# Patient Record
Sex: MALE | Race: WHITE | NOT HISPANIC OR LATINO | Employment: FULL TIME | URBAN - METROPOLITAN AREA
[De-identification: names, ages, dates, MRNs, and addresses within clinical notes are randomized per-mention and may not be internally consistent; named-entity substitution may affect disease eponyms.]

---

## 2018-04-21 ENCOUNTER — HOSPITAL ENCOUNTER (OUTPATIENT)
Facility: CLINIC | Age: 53
Discharge: HOME | End: 2018-04-21
Attending: EMERGENCY MEDICINE
Payer: COMMERCIAL

## 2018-04-21 VITALS
OXYGEN SATURATION: 97 % | DIASTOLIC BLOOD PRESSURE: 82 MMHG | HEIGHT: 70 IN | WEIGHT: 238 LBS | SYSTOLIC BLOOD PRESSURE: 128 MMHG | RESPIRATION RATE: 16 BRPM | TEMPERATURE: 98.5 F | BODY MASS INDEX: 34.07 KG/M2 | HEART RATE: 55 BPM

## 2018-04-21 DIAGNOSIS — J01.90 ACUTE NON-RECURRENT SINUSITIS, UNSPECIFIED LOCATION: Primary | ICD-10-CM

## 2018-04-21 PROCEDURE — 99213 OFFICE O/P EST LOW 20 MIN: CPT | Performed by: EMERGENCY MEDICINE

## 2018-04-21 PROCEDURE — S9088 SERVICES PROVIDED IN URGENT: HCPCS | Performed by: EMERGENCY MEDICINE

## 2018-04-21 RX ORDER — TAMSULOSIN HYDROCHLORIDE 0.4 MG/1
0.4 CAPSULE ORAL
COMMUNITY
End: 2018-11-07 | Stop reason: SDUPTHER

## 2018-04-21 RX ORDER — ASPIRIN 81 MG/1
81 TABLET ORAL
COMMUNITY
Start: 2012-11-30

## 2018-04-21 RX ORDER — SIMVASTATIN 10 MG/1
10 TABLET, FILM COATED ORAL
COMMUNITY
Start: 2017-10-29 | End: 2018-11-07 | Stop reason: SDUPTHER

## 2018-04-21 RX ORDER — AMOXICILLIN AND CLAVULANATE POTASSIUM 875; 125 MG/1; MG/1
1 TABLET, FILM COATED ORAL 2 TIMES DAILY
Qty: 20 TABLET | Refills: 0 | Status: SHIPPED | OUTPATIENT
Start: 2018-04-21 | End: 2018-11-07 | Stop reason: ALTCHOICE

## 2018-04-21 ASSESSMENT — ENCOUNTER SYMPTOMS
FEVER: 1
RHINORRHEA: 1
COUGH: 1
SINUS CONGESTION: 1

## 2018-04-21 NOTE — ED PROVIDER NOTES
History  Chief Complaint   Patient presents with   • Cough     Pain L orbital area an dL jaw x 5 days, sts it his typical once/dayer sinus infection. Always gets it on the L side with tyhese sxs/         Cough   Cough characteristics:  Productive  Sputum characteristics:  Yellow  Severity:  Moderate  Onset quality:  Gradual  Duration:  5 days  Timing:  Constant  Progression:  Worsening  Chronicity:  New  Smoker: no    Context: upper respiratory infection    Relieved by:  Nothing  Ineffective treatments:  None tried  Associated symptoms: fever, rhinorrhea and sinus congestion        History reviewed. No pertinent past medical history.    History reviewed. No pertinent surgical history.    Family History   Problem Relation Age of Onset   • Diabetes Mother    • Multiple myeloma Father        Social History   Substance Use Topics   • Smoking status: Never Smoker   • Smokeless tobacco: Never Used   • Alcohol use Not on file       Review of Systems   Constitutional: Positive for fever.   HENT: Positive for rhinorrhea.    Respiratory: Positive for cough.        Physical Exam  ED Triage Vitals [04/21/18 1349]   Temp Heart Rate Resp BP SpO2   36.9 °C (98.5 °F) (!) 55 16 128/82 97 %      Temp Source Heart Rate Source Patient Position BP Location FiO2 (%) (Set)   Oral Monitor Sitting Left upper arm --       Physical Exam   Constitutional: He appears well-developed and well-nourished.   HENT:   Right Ear: Tympanic membrane normal.   Left Ear: Tympanic membrane normal.   Nose: Mucosal edema, rhinorrhea and septal deviation present.   Mouth/Throat: Uvula is midline and oropharynx is clear and moist.   Eyes: Conjunctivae are normal.   Cardiovascular: Regular rhythm and normal heart sounds.    Pulmonary/Chest: Effort normal and breath sounds normal.         Procedures  Procedures    UC Course  ED Course          Clinical Impressions as of Apr 21 1413   Acute non-recurrent sinusitis, unspecified location       MDM                Steve Connelly MD  04/21/18 3261

## 2018-04-30 ENCOUNTER — TRANSCRIBE ORDERS (OUTPATIENT)
Dept: SCHEDULING | Age: 53
End: 2018-04-30

## 2018-04-30 DIAGNOSIS — N28.1 ACQUIRED CYST OF KIDNEY: Primary | ICD-10-CM

## 2018-05-01 ENCOUNTER — APPOINTMENT (OUTPATIENT)
Dept: RADIOLOGY | Age: 53
End: 2018-05-01
Attending: UROLOGY
Payer: COMMERCIAL

## 2018-05-01 DIAGNOSIS — N28.1 ACQUIRED CYST OF KIDNEY: ICD-10-CM

## 2018-05-01 PROCEDURE — 76775 US EXAM ABDO BACK WALL LIM: CPT

## 2018-05-03 ENCOUNTER — TRANSCRIBE ORDERS (OUTPATIENT)
Dept: LAB | Age: 53
End: 2018-05-03

## 2018-05-03 ENCOUNTER — APPOINTMENT (OUTPATIENT)
Dept: LAB | Age: 53
End: 2018-05-03
Attending: UROLOGY
Payer: COMMERCIAL

## 2018-05-03 DIAGNOSIS — N41.9 PROSTATITIS, UNSPECIFIED PROSTATITIS TYPE: ICD-10-CM

## 2018-05-03 DIAGNOSIS — R97.20 ELEVATED PSA: ICD-10-CM

## 2018-05-03 DIAGNOSIS — R97.20 ELEVATED PSA: Primary | ICD-10-CM

## 2018-05-03 LAB — PSA SERPL-MCNC: 7.91 NG/ML

## 2018-05-03 PROCEDURE — 84153 ASSAY OF PSA TOTAL: CPT

## 2018-05-03 PROCEDURE — 36415 COLL VENOUS BLD VENIPUNCTURE: CPT

## 2018-09-12 ENCOUNTER — TRANSCRIBE ORDERS (OUTPATIENT)
Dept: LAB | Age: 53
End: 2018-09-12

## 2018-09-12 ENCOUNTER — APPOINTMENT (OUTPATIENT)
Dept: LAB | Age: 53
End: 2018-09-12
Attending: UROLOGY
Payer: COMMERCIAL

## 2018-09-12 DIAGNOSIS — N41.9 INFLAMMATORY DISEASE OF PROSTATE: Primary | ICD-10-CM

## 2018-09-12 DIAGNOSIS — N41.9 INFLAMMATORY DISEASE OF PROSTATE: ICD-10-CM

## 2018-09-12 DIAGNOSIS — R97.20 ELEVATED PROSTATE SPECIFIC ANTIGEN (PSA): ICD-10-CM

## 2018-09-12 DIAGNOSIS — N40.0 ENLARGED PROSTATE WITHOUT LOWER URINARY TRACT SYMPTOMS (LUTS): ICD-10-CM

## 2018-09-12 LAB — PSA SERPL-MCNC: 8.97 NG/ML

## 2018-09-12 PROCEDURE — 36415 COLL VENOUS BLD VENIPUNCTURE: CPT

## 2018-09-12 PROCEDURE — 84153 ASSAY OF PSA TOTAL: CPT

## 2018-10-01 ENCOUNTER — TRANSCRIBE ORDERS (OUTPATIENT)
Dept: SCHEDULING | Age: 53
End: 2018-10-01

## 2018-10-01 DIAGNOSIS — R97.20 ELEVATED PROSTATE SPECIFIC ANTIGEN (PSA): Primary | ICD-10-CM

## 2018-10-02 ENCOUNTER — TRANSCRIBE ORDERS (OUTPATIENT)
Dept: LAB | Age: 53
End: 2018-10-02

## 2018-10-02 ENCOUNTER — APPOINTMENT (OUTPATIENT)
Dept: LAB | Age: 53
End: 2018-10-02
Attending: UROLOGY
Payer: COMMERCIAL

## 2018-10-02 DIAGNOSIS — R97.20 ELEVATED PROSTATE SPECIFIC ANTIGEN (PSA): ICD-10-CM

## 2018-10-02 DIAGNOSIS — N41.9 INFLAMMATORY DISEASE OF PROSTATE: ICD-10-CM

## 2018-10-02 DIAGNOSIS — R97.20 ELEVATED PROSTATE SPECIFIC ANTIGEN (PSA): Primary | ICD-10-CM

## 2018-10-02 LAB
BUN SERPL-MCNC: 12 MG/DL (ref 8–20)
CREAT SERPL-MCNC: 1.2 MG/DL (ref 0.8–1.3)
GFR SERPL CREATININE-BSD FRML MDRD: >60 ML/MIN/1.73M*2

## 2018-10-02 PROCEDURE — 82565 ASSAY OF CREATININE: CPT

## 2018-10-02 PROCEDURE — 36415 COLL VENOUS BLD VENIPUNCTURE: CPT

## 2018-10-02 PROCEDURE — 84520 ASSAY OF UREA NITROGEN: CPT

## 2018-10-12 ENCOUNTER — HOSPITAL ENCOUNTER (OUTPATIENT)
Dept: RADIOLOGY | Age: 53
Discharge: HOME | End: 2018-10-12
Attending: UROLOGY
Payer: COMMERCIAL

## 2018-10-12 DIAGNOSIS — R97.20 ELEVATED PROSTATE SPECIFIC ANTIGEN (PSA): ICD-10-CM

## 2018-10-12 RX ORDER — GADOBUTROL 604.72 MG/ML
10.5 INJECTION INTRAVENOUS ONCE
Status: COMPLETED | OUTPATIENT
Start: 2018-10-12 | End: 2018-10-12

## 2018-10-12 RX ADMIN — GADOBUTROL 10.5 ML: 604.72 INJECTION INTRAVENOUS at 11:12

## 2018-11-07 ENCOUNTER — OFFICE VISIT (OUTPATIENT)
Dept: PRIMARY CARE | Facility: CLINIC | Age: 53
End: 2018-11-07
Payer: COMMERCIAL

## 2018-11-07 ENCOUNTER — HOSPITAL ENCOUNTER (OUTPATIENT)
Dept: RADIOLOGY | Age: 53
Discharge: HOME | End: 2018-11-07
Attending: FAMILY MEDICINE
Payer: COMMERCIAL

## 2018-11-07 VITALS
HEART RATE: 97 BPM | SYSTOLIC BLOOD PRESSURE: 100 MMHG | DIASTOLIC BLOOD PRESSURE: 70 MMHG | WEIGHT: 237 LBS | HEIGHT: 70 IN | RESPIRATION RATE: 16 BRPM | TEMPERATURE: 98.5 F | BODY MASS INDEX: 33.93 KG/M2 | OXYGEN SATURATION: 96 %

## 2018-11-07 DIAGNOSIS — R97.20 ELEVATED PSA: ICD-10-CM

## 2018-11-07 DIAGNOSIS — M79.671 RIGHT FOOT PAIN: ICD-10-CM

## 2018-11-07 DIAGNOSIS — M54.50 LOW BACK PAIN WITHOUT SCIATICA, UNSPECIFIED BACK PAIN LATERALITY, UNSPECIFIED CHRONICITY: Primary | ICD-10-CM

## 2018-11-07 DIAGNOSIS — M54.50 LOW BACK PAIN WITHOUT SCIATICA, UNSPECIFIED BACK PAIN LATERALITY, UNSPECIFIED CHRONICITY: ICD-10-CM

## 2018-11-07 PROCEDURE — 99213 OFFICE O/P EST LOW 20 MIN: CPT | Performed by: FAMILY MEDICINE

## 2018-11-07 PROCEDURE — 73630 X-RAY EXAM OF FOOT: CPT | Mod: RT

## 2018-11-07 PROCEDURE — 72072 X-RAY EXAM THORAC SPINE 3VWS: CPT

## 2018-11-07 PROCEDURE — 72110 X-RAY EXAM L-2 SPINE 4/>VWS: CPT

## 2018-11-07 RX ORDER — TAMSULOSIN HYDROCHLORIDE 0.4 MG/1
0.4 CAPSULE ORAL NIGHTLY
Qty: 90 CAPSULE | Refills: 1 | Status: SHIPPED | OUTPATIENT
Start: 2018-11-07 | End: 2021-09-13

## 2018-11-07 RX ORDER — ZOLPIDEM TARTRATE 5 MG/1
5 TABLET ORAL NIGHTLY PRN
Qty: 30 TABLET | Refills: 0 | Status: SHIPPED | OUTPATIENT
Start: 2018-11-07 | End: 2019-11-20

## 2018-11-07 RX ORDER — SIMVASTATIN 10 MG/1
10 TABLET, FILM COATED ORAL NIGHTLY
Qty: 90 TABLET | Refills: 1 | Status: SHIPPED | OUTPATIENT
Start: 2018-11-07 | End: 2019-05-07 | Stop reason: SDUPTHER

## 2018-11-07 ASSESSMENT — ENCOUNTER SYMPTOMS
ARTHRALGIAS: 1
CHILLS: 0
POLYDIPSIA: 0
HEMATURIA: 0
ABDOMINAL PAIN: 0
HEADACHES: 0
NUMBNESS: 0
WHEEZING: 0
VOMITING: 0
SLEEP DISTURBANCE: 1
NAUSEA: 0
WEAKNESS: 0
COUGH: 0
DIARRHEA: 0
BACK PAIN: 1
SHORTNESS OF BREATH: 0
DYSURIA: 0
DIFFICULTY URINATING: 1
NERVOUS/ANXIOUS: 1
CONSTIPATION: 0
POLYPHAGIA: 0
BLOOD IN STOOL: 0
FEVER: 0
DIZZINESS: 0
JOINT SWELLING: 0

## 2018-11-07 NOTE — PROGRESS NOTES
"Subjective      Patient ID: Cesar Cruz is a 53 y.o. male.  1965      [facility]        The following have been reviewed and updated as appropriate in this visit:  Problems       Review of Systems   Constitutional: Negative for chills and fever.   Eyes: Negative for visual disturbance.   Respiratory: Negative for cough, shortness of breath and wheezing.    Cardiovascular: Negative for chest pain and leg swelling.   Gastrointestinal: Negative for abdominal pain, blood in stool, constipation, diarrhea, nausea and vomiting.   Endocrine: Negative for cold intolerance, heat intolerance, polydipsia, polyphagia and polyuria.   Genitourinary: Positive for difficulty urinating. Negative for discharge, dysuria and hematuria.   Musculoskeletal: Positive for arthralgias and back pain. Negative for gait problem and joint swelling.   Skin: Negative for rash.   Neurological: Negative for dizziness, syncope, weakness, numbness and headaches.   Psychiatric/Behavioral: Positive for sleep disturbance. The patient is nervous/anxious.    All other systems reviewed and are negative.      Objective     Vitals:    11/07/18 1047   BP: 100/70   BP Location: Right upper arm   Patient Position: Sitting   Pulse: 97   Resp: 16   Temp: 36.9 °C (98.5 °F)   TempSrc: Oral   SpO2: 96%   Weight: 108 kg (237 lb)   Height: 1.765 m (5' 9.5\")     Body mass index is 34.5 kg/m².    Physical Exam   Constitutional: He appears well-developed and well-nourished. No distress.   Cardiovascular: Normal rate, regular rhythm and normal heart sounds.    Pulmonary/Chest: Effort normal and breath sounds normal.   Musculoskeletal: He exhibits no edema, tenderness or deformity.   Neurological: He is alert.   Psychiatric: His speech is normal and behavior is normal. Thought content normal. His mood appears anxious. His affect is angry.   Vitals reviewed.      Assessment/Plan   Problem List Items Addressed This Visit     None      Visit Diagnoses     Low back pain " without sciatica, unspecified back pain laterality, unspecified chronicity    -  Primary    Relevant Orders    X-RAY LUMBAR SPINE COMPLETE 4+ VIEWS    Right foot pain        Relevant Orders    X-RAY FOOT RIGHT 3+ VIEWS    Elevated PSA        sees [facility] now  bx planned  very upset and worried

## 2018-12-05 ENCOUNTER — APPOINTMENT (EMERGENCY)
Dept: RADIOLOGY | Facility: HOSPITAL | Age: 53
End: 2018-12-05
Attending: EMERGENCY MEDICINE
Payer: COMMERCIAL

## 2018-12-05 ENCOUNTER — HOSPITAL ENCOUNTER (EMERGENCY)
Facility: HOSPITAL | Age: 53
Discharge: HOME | End: 2018-12-05
Attending: EMERGENCY MEDICINE
Payer: COMMERCIAL

## 2018-12-05 VITALS
SYSTOLIC BLOOD PRESSURE: 130 MMHG | HEART RATE: 80 BPM | OXYGEN SATURATION: 97 % | TEMPERATURE: 98 F | DIASTOLIC BLOOD PRESSURE: 95 MMHG | RESPIRATION RATE: 18 BRPM

## 2018-12-05 DIAGNOSIS — M54.6 ACUTE RIGHT-SIDED THORACIC BACK PAIN: Primary | ICD-10-CM

## 2018-12-05 LAB
ALBUMIN SERPL-MCNC: 4.4 G/DL (ref 3.4–5)
ALP SERPL-CCNC: 79 IU/L (ref 35–126)
ALT SERPL-CCNC: 35 IU/L (ref 16–63)
ANION GAP SERPL CALC-SCNC: 10 MEQ/L (ref 3–15)
AST SERPL-CCNC: 33 IU/L (ref 15–41)
BACTERIA URNS QL MICRO: ABNORMAL /HPF
BASOPHILS # BLD: 0.04 K/UL (ref 0.01–0.1)
BASOPHILS NFR BLD: 0.4 %
BILIRUB SERPL-MCNC: 0.8 MG/DL (ref 0.3–1.2)
BILIRUB UR QL STRIP.AUTO: NEGATIVE MG/DL
BUN SERPL-MCNC: 16 MG/DL (ref 8–20)
CALCIUM SERPL-MCNC: 9.5 MG/DL (ref 8.9–10.3)
CHLORIDE SERPL-SCNC: 103 MEQ/L (ref 98–109)
CLARITY UR REFRACT.AUTO: ABNORMAL
CO2 SERPL-SCNC: 24 MEQ/L (ref 22–32)
COLOR UR AUTO: YELLOW
CREAT SERPL-MCNC: 1.1 MG/DL
DIFFERENTIAL METHOD BLD: NORMAL
EOSINOPHIL # BLD: 0.08 K/UL (ref 0.04–0.54)
EOSINOPHIL NFR BLD: 0.9 %
ERYTHROCYTE [DISTWIDTH] IN BLOOD BY AUTOMATED COUNT: 13.9 % (ref 11.6–14.4)
GFR SERPL CREATININE-BSD FRML MDRD: >60 ML/MIN/1.73M*2
GLUCOSE SERPL-MCNC: 98 MG/DL (ref 70–99)
GLUCOSE UR STRIP.AUTO-MCNC: NEGATIVE MG/DL
HCT VFR BLDCO AUTO: 49.7 %
HGB BLD-MCNC: 16.5 G/DL
HGB UR QL STRIP.AUTO: ABNORMAL
HYALINE CASTS #/AREA URNS LPF: ABNORMAL /LPF
IMM GRANULOCYTES # BLD AUTO: 0.04 K/UL (ref 0–0.08)
IMM GRANULOCYTES NFR BLD AUTO: 0.4 %
KETONES UR STRIP.AUTO-MCNC: NEGATIVE MG/DL
LEUKOCYTE ESTERASE UR QL STRIP.AUTO: 1
LIPASE SERPL-CCNC: 25 U/L (ref 20–51)
LYMPHOCYTES # BLD: 1.98 K/UL (ref 1.2–3.5)
LYMPHOCYTES NFR BLD: 21.8 %
MCH RBC QN AUTO: 29.6 PG (ref 28–33.2)
MCHC RBC AUTO-ENTMCNC: 33.2 G/DL (ref 32.2–36.5)
MCV RBC AUTO: 89.1 FL (ref 83–98)
MONOCYTES # BLD: 0.82 K/UL (ref 0.3–1)
MONOCYTES NFR BLD: 9 %
NEUTROPHILS # BLD: 6.11 K/UL (ref 1.7–7)
NEUTS SEG NFR BLD: 67.5 %
NITRITE UR QL STRIP.AUTO: NEGATIVE
NRBC BLD-RTO: 0 %
PDW BLD AUTO: 9.7 FL (ref 9.4–12.4)
PH UR STRIP.AUTO: 5.5 [PH]
PLATELET # BLD AUTO: 237 K/UL
POTASSIUM SERPL-SCNC: 4.2 MEQ/L (ref 3.6–5.1)
PROT SERPL-MCNC: 8.1 G/DL (ref 6–8.2)
PROT UR QL STRIP.AUTO: 1
RBC # BLD AUTO: 5.58 M/UL (ref 4.5–5.8)
RBC #/AREA URNS HPF: ABNORMAL /HPF
SODIUM SERPL-SCNC: 137 MEQ/L (ref 136–144)
SP GR UR REFRACT.AUTO: 1.02
SQUAMOUS URNS QL MICRO: 1 /HPF
UROBILINOGEN UR STRIP-ACNC: 0.2 EU/DL
WBC # BLD AUTO: 9.07 K/UL
WBC #/AREA URNS HPF: ABNORMAL /HPF

## 2018-12-05 PROCEDURE — 87086 URINE CULTURE/COLONY COUNT: CPT | Performed by: PHYSICIAN ASSISTANT

## 2018-12-05 PROCEDURE — 85025 COMPLETE CBC W/AUTO DIFF WBC: CPT | Performed by: PHYSICIAN ASSISTANT

## 2018-12-05 PROCEDURE — 99284 EMERGENCY DEPT VISIT MOD MDM: CPT | Mod: 25

## 2018-12-05 PROCEDURE — 63600000 HC DRUGS/DETAIL CODE: Performed by: PHYSICIAN ASSISTANT

## 2018-12-05 PROCEDURE — 36415 COLL VENOUS BLD VENIPUNCTURE: CPT | Performed by: PHYSICIAN ASSISTANT

## 2018-12-05 PROCEDURE — 74176 CT ABD & PELVIS W/O CONTRAST: CPT

## 2018-12-05 PROCEDURE — 83690 ASSAY OF LIPASE: CPT | Performed by: PHYSICIAN ASSISTANT

## 2018-12-05 PROCEDURE — 80053 COMPREHEN METABOLIC PANEL: CPT | Performed by: PHYSICIAN ASSISTANT

## 2018-12-05 PROCEDURE — 81003 URINALYSIS AUTO W/O SCOPE: CPT | Performed by: PHYSICIAN ASSISTANT

## 2018-12-05 PROCEDURE — 25800000 HC PHARMACY IV SOLUTIONS: Performed by: PHYSICIAN ASSISTANT

## 2018-12-05 RX ORDER — LIDOCAINE 50 MG/G
1 PATCH TOPICAL DAILY
Qty: 20 PATCH | Refills: 0 | Status: SHIPPED | OUTPATIENT
Start: 2018-12-05

## 2018-12-05 RX ORDER — KETOROLAC TROMETHAMINE 30 MG/ML
30 INJECTION, SOLUTION INTRAMUSCULAR; INTRAVENOUS ONCE
Status: COMPLETED | OUTPATIENT
Start: 2018-12-05 | End: 2018-12-05

## 2018-12-05 RX ORDER — HYDROCODONE BITARTRATE AND ACETAMINOPHEN 5; 325 MG/1; MG/1
1 TABLET ORAL EVERY 6 HOURS PRN
Qty: 12 TABLET | Refills: 0 | Status: SHIPPED | OUTPATIENT
Start: 2018-12-05 | End: 2019-11-20

## 2018-12-05 RX ADMIN — KETOROLAC TROMETHAMINE 30 MG: 30 INJECTION, SOLUTION INTRAMUSCULAR at 15:05

## 2018-12-05 RX ADMIN — SODIUM CHLORIDE 1000 ML: 9 INJECTION, SOLUTION INTRAVENOUS at 15:04

## 2018-12-05 ASSESSMENT — ENCOUNTER SYMPTOMS
FEVER: 0
NAUSEA: 0
WOUND: 0
CHILLS: 0
NECK PAIN: 0
FLANK PAIN: 1
LIGHT-HEADEDNESS: 0
BACK PAIN: 1
CONSTIPATION: 0
DYSURIA: 0
COLOR CHANGE: 0
DIFFICULTY URINATING: 0
CHEST TIGHTNESS: 0
SEIZURES: 0
DIARRHEA: 0
PALPITATIONS: 0
VOMITING: 0
HEADACHES: 0
FREQUENCY: 0
ABDOMINAL PAIN: 0
HEMATURIA: 1
SHORTNESS OF BREATH: 0

## 2018-12-05 NOTE — ED PROVIDER NOTES
HPI     Chief Complaint   Patient presents with   • Back Pain       53-year-old male complaining of right flank pain times several days.  Patient reports pain started suddenly, worse than any back pain he has ever had in the past and improved after time.  Patient reports back pain has been intermittent.  Denies nausea or vomiting associated with pain.  Patient also reports he recently had prostate biopsy on 11/13/2018.  Reports noticing some blood in urine over the last few weeks after biopsy which he is aware can be normal but also reports noticing what he thought was pus in urine today.  Patient denies fevers, chills, chest pain, shortness of breath.  Patient reports he has chronic low back pain and reports pain today is in a different location, higher up in his back.  Reports taking left over tramadol for pain with temporary relief.             Patient History     History reviewed. No pertinent past medical history.    History reviewed. No pertinent surgical history.    Family History   Problem Relation Age of Onset   • Diabetes Mother    • Multiple myeloma Father        Social History   Substance Use Topics   • Smoking status: Never Smoker   • Smokeless tobacco: Never Used   • Alcohol use Not on file       Systems Reviewed from Nursing Triage:          Review of Systems     Review of Systems   Constitutional: Negative for chills and fever.   Eyes: Negative for visual disturbance.   Respiratory: Negative for chest tightness and shortness of breath.    Cardiovascular: Negative for chest pain and palpitations.   Gastrointestinal: Negative for abdominal pain, constipation, diarrhea, nausea and vomiting.   Genitourinary: Positive for flank pain and hematuria. Negative for difficulty urinating, dysuria and frequency.   Musculoskeletal: Positive for back pain. Negative for gait problem and neck pain.   Skin: Negative for color change, rash and wound.   Neurological: Negative for seizures, syncope, light-headedness and  headaches.        Physical Exam     ED Triage Vitals [12/05/18 1306]   Temp Heart Rate Resp BP SpO2   37.1 °C (98.7 °F) 97 16 (!) 156/99 99 %      Temp Source Heart Rate Source Patient Position BP Location FiO2 (%) (Set)   Oral Monitor -- Left upper arm --                     Patient Vitals for the past 24 hrs:   BP Temp Temp src Pulse Resp SpO2   12/05/18 1306 (!) 156/99 37.1 °C (98.7 °F) Oral 97 16 99 %           Physical Exam   Constitutional: He is oriented to person, place, and time. He appears well-developed and well-nourished. No distress.   HENT:   Head: Normocephalic and atraumatic.   Mouth/Throat: Oropharynx is clear and moist.   Eyes: Conjunctivae are normal. Pupils are equal, round, and reactive to light.   Neck: Normal range of motion. Neck supple.   Cardiovascular: Normal rate, regular rhythm and normal heart sounds.  Exam reveals no friction rub.    No murmur heard.  Pulmonary/Chest: Effort normal and breath sounds normal. No respiratory distress. He has no wheezes. He has no rales.   Abdominal: Soft. Bowel sounds are normal. He exhibits no distension and no mass. There is no tenderness. There is CVA tenderness (R). There is no rigidity, no rebound, no guarding, no tenderness at McBurney's point and negative Quezada's sign.   Neurological: He is alert and oriented to person, place, and time. No cranial nerve deficit or sensory deficit.   Skin: Skin is warm and dry. Capillary refill takes less than 2 seconds.   Psychiatric: He has a normal mood and affect.   Nursing note and vitals reviewed.           Procedures    ED Course & MDM     Labs Reviewed   UA REFLEX CULTURE (MACROSCOPIC) - Abnormal        Result Value    Color, Urine Yellow      Clarity, Urine Cloudy (*)     Specific Gravity, Urine 1.023      pH, Urine 5.5      Leukocyte Esterase +1 (*)     Nitrite, Urine Negative      Protein, Urine +1 (*)     Glucose, Urine Negative      Ketones, Urine Negative      Urobilinogen, Urine 0.2      Bilirubin,  Urine Negative      Blood, Urine Trace (*)    UA MICROSCOPIC - Abnormal     RBC, Urine 0 TO 4      WBC, Urine 0 TO 3      Squamous Epithelial +1 (*)     Hyaline Cast 3 TO 9 (*)     Bacteria, Urine Rare (*)    CBC - Normal    WBC 9.07      RBC 5.58      Hemoglobin 16.5      Hematocrit 49.7      MCV 89.1      MCH 29.6      MCHC 33.2      RDW 13.9      Platelets 237      MPV 9.7     URINE CULTURE   URINALYSIS REFLEX CULTURE    Narrative:     The following orders were created for panel order Urinalysis w/ reflex culture.  Procedure                               Abnormality         Status                     ---------                               -----------         ------                     UA Reflex to Culture (Mac...[[id number]]  Abnormal            Final result               UA Microscopic[[id number]]                Abnormal            Final result                 Please view results for these tests on the individual orders.   CBC AND DIFFERENTIAL    Narrative:     The following orders were created for panel order CBC and differential.  Procedure                               Abnormality         Status                     ---------                               -----------         ------                     CBC[[id number]]                           Normal              Final result               Diff Count[[id number]]                                        Final result                 Please view results for these tests on the individual orders.   DIFF COUNT    Differential Type Auto      nRBC 0.0      Immature Granulocytes 0.4      Neutrophils 67.5      Lymphocytes 21.8      Monocytes 9.0      Eosinophils 0.9      Basophils 0.4      Immature Granulocytes, Absolute 0.04      Neutrophils, Absolute 6.11      Lymphocytes, Absolute 1.98      Monocytes, Absolute 0.82      Eosinophils, Absolute 0.08      Basophils, Absolute 0.04     COMPREHENSIVE METABOLIC PANEL   LIPASE       CT ABDOMEN PELVIS WITHOUT IV CONTRAST    (Results  Pending)               MDM  Number of Diagnoses or Management Options     Amount and/or Complexity of Data Reviewed  Clinical lab tests: reviewed             ED Course as of Dec 05 1552   Wed Dec 05, 2018   1541 Discussed results, proper med use, need for f/u and return precautions, pt verb understanding.    [ET]      ED Course User Index  [ET] Kym Thomason PA C         Clinical Impressions as of Dec 05 1552   Acute right-sided thoracic back pain        Kym Thomason PA C  12/05/18 1557

## 2018-12-05 NOTE — ED ATTESTATION NOTE
The patient was evaluated and managed by the physician assistant / nurse practitioner.     Jerson Santoro MD  12/05/18 9180

## 2018-12-05 NOTE — DISCHARGE INSTRUCTIONS
RETURN TO THE ER IF WORSE  Follow up with primary care doctor as soon as possible  Take medications as prescribed   Do not take medications while working or driving as they can cause drowsiness

## 2018-12-06 ENCOUNTER — TELEPHONE (OUTPATIENT)
Dept: PRIMARY CARE | Facility: CLINIC | Age: 53
End: 2018-12-06

## 2018-12-06 DIAGNOSIS — M54.2 CERVICALGIA: Primary | ICD-10-CM

## 2018-12-06 DIAGNOSIS — G89.29 CHRONIC LOW BACK PAIN, UNSPECIFIED BACK PAIN LATERALITY, WITH SCIATICA PRESENCE UNSPECIFIED: ICD-10-CM

## 2018-12-06 DIAGNOSIS — M54.5 CHRONIC LOW BACK PAIN, UNSPECIFIED BACK PAIN LATERALITY, WITH SCIATICA PRESENCE UNSPECIFIED: ICD-10-CM

## 2018-12-06 PROBLEM — R73.09 ELEVATED GLUCOSE: Status: ACTIVE | Noted: 2017-02-23

## 2018-12-06 PROBLEM — K76.0 FATTY LIVER: Status: ACTIVE | Noted: 2017-02-21

## 2018-12-06 PROBLEM — R97.20 ELEVATED PROSTATE SPECIFIC ANTIGEN (PSA): Status: ACTIVE | Noted: 2018-11-02

## 2018-12-06 PROBLEM — R97.20 ELEVATED PSA: Status: ACTIVE | Noted: 2018-12-06

## 2018-12-06 PROBLEM — N40.1 BPH WITH OBSTRUCTION/LOWER URINARY TRACT SYMPTOMS: Status: ACTIVE | Noted: 2018-12-06

## 2018-12-06 PROBLEM — N13.8 BPH WITH OBSTRUCTION/LOWER URINARY TRACT SYMPTOMS: Status: ACTIVE | Noted: 2018-12-06

## 2018-12-06 LAB — BACTERIA UR CULT: NORMAL

## 2018-12-06 NOTE — TELEPHONE ENCOUNTER
Patient is request script for MRI of entire back. He tried to make appt with neurosurgeon but he is requiring this MRI first. He said the places does 3 sessions for the top, middle and lower back so the script needs to be for entire spine. Please fax to [facility] in [place] at [phone] and call pt on cell once faxed so he can schedule.

## 2019-05-07 ENCOUNTER — TELEPHONE (OUTPATIENT)
Dept: PRIMARY CARE | Facility: CLINIC | Age: 54
End: 2019-05-07

## 2019-05-07 RX ORDER — SIMVASTATIN 10 MG/1
TABLET, FILM COATED ORAL
Qty: 90 TABLET | Refills: 1 | Status: SHIPPED | OUTPATIENT
Start: 2019-05-07 | End: 2019-11-10 | Stop reason: SDUPTHER

## 2019-05-15 DIAGNOSIS — R97.20 ELEVATED PSA: Primary | ICD-10-CM

## 2019-05-15 DIAGNOSIS — Z11.59 NEED FOR HEPATITIS C SCREENING TEST: ICD-10-CM

## 2019-05-15 DIAGNOSIS — R73.09 ELEVATED GLUCOSE: ICD-10-CM

## 2019-05-15 DIAGNOSIS — E78.5 HYPERLIPIDEMIA, UNSPECIFIED HYPERLIPIDEMIA TYPE: Primary | ICD-10-CM

## 2019-05-16 ENCOUNTER — APPOINTMENT (OUTPATIENT)
Dept: LAB | Age: 54
End: 2019-05-16
Attending: FAMILY MEDICINE
Payer: COMMERCIAL

## 2019-05-16 DIAGNOSIS — R73.09 ELEVATED GLUCOSE: ICD-10-CM

## 2019-05-16 DIAGNOSIS — E78.5 HYPERLIPIDEMIA, UNSPECIFIED HYPERLIPIDEMIA TYPE: ICD-10-CM

## 2019-05-16 DIAGNOSIS — Z11.59 NEED FOR HEPATITIS C SCREENING TEST: ICD-10-CM

## 2019-05-16 DIAGNOSIS — R97.20 ELEVATED PSA: ICD-10-CM

## 2019-05-16 LAB
ALBUMIN SERPL-MCNC: 3.8 G/DL (ref 3.4–5)
ALP SERPL-CCNC: 73 IU/L (ref 35–126)
ALT SERPL-CCNC: 28 IU/L (ref 16–63)
ANION GAP SERPL CALC-SCNC: 8 MEQ/L (ref 3–15)
AST SERPL-CCNC: 26 IU/L (ref 15–41)
BILIRUB SERPL-MCNC: 0.6 MG/DL (ref 0.3–1.2)
BUN SERPL-MCNC: 14 MG/DL (ref 8–20)
CALCIUM SERPL-MCNC: 9.2 MG/DL (ref 8.9–10.3)
CHLORIDE SERPL-SCNC: 105 MEQ/L (ref 98–109)
CHOLEST SERPL-MCNC: 163 MG/DL
CO2 SERPL-SCNC: 26 MEQ/L (ref 22–32)
CREAT SERPL-MCNC: 1.2 MG/DL
ERYTHROCYTE [DISTWIDTH] IN BLOOD BY AUTOMATED COUNT: 14.2 % (ref 11.6–14.4)
EST. AVERAGE GLUCOSE BLD GHB EST-MCNC: 123 MG/DL
GFR SERPL CREATININE-BSD FRML MDRD: >60 ML/MIN/1.73M*2
GLUCOSE SERPL-MCNC: 98 MG/DL (ref 70–99)
HBA1C MFR BLD HPLC: 5.9 %
HCT VFR BLDCO AUTO: 49.9 %
HCV AB SER QL: NONREACTIVE
HDLC SERPL-MCNC: 46 MG/DL
HDLC SERPL: 3.5 {RATIO}
HGB BLD-MCNC: 16.4 G/DL
LDLC SERPL CALC-MCNC: 95 MG/DL
MCH RBC QN AUTO: 29.2 PG (ref 28–33.2)
MCHC RBC AUTO-ENTMCNC: 32.9 G/DL (ref 32.2–36.5)
MCV RBC AUTO: 88.9 FL (ref 83–98)
NONHDLC SERPL-MCNC: 117 MG/DL
PDW BLD AUTO: 10.4 FL (ref 9.4–12.4)
PLATELET # BLD AUTO: 206 K/UL
POTASSIUM SERPL-SCNC: 4.6 MEQ/L (ref 3.6–5.1)
PROT SERPL-MCNC: 7.1 G/DL (ref 6–8.2)
PSA SERPL-MCNC: 7.45 NG/ML
RBC # BLD AUTO: 5.61 M/UL (ref 4.5–5.8)
SODIUM SERPL-SCNC: 139 MEQ/L (ref 136–144)
TRIGL SERPL-MCNC: 110 MG/DL (ref 30–149)
WBC # BLD AUTO: 7.41 K/UL

## 2019-05-16 PROCEDURE — 85027 COMPLETE CBC AUTOMATED: CPT

## 2019-05-16 PROCEDURE — 80053 COMPREHEN METABOLIC PANEL: CPT

## 2019-05-16 PROCEDURE — 86803 HEPATITIS C AB TEST: CPT

## 2019-05-16 PROCEDURE — 80061 LIPID PANEL: CPT

## 2019-05-16 PROCEDURE — 84153 ASSAY OF PSA TOTAL: CPT

## 2019-05-16 PROCEDURE — 36415 COLL VENOUS BLD VENIPUNCTURE: CPT

## 2019-05-16 PROCEDURE — 83036 HEMOGLOBIN GLYCOSYLATED A1C: CPT

## 2019-11-20 ENCOUNTER — OFFICE VISIT (OUTPATIENT)
Dept: PRIMARY CARE | Facility: CLINIC | Age: 54
End: 2019-11-20
Payer: COMMERCIAL

## 2019-11-20 VITALS
DIASTOLIC BLOOD PRESSURE: 82 MMHG | BODY MASS INDEX: 38.03 KG/M2 | SYSTOLIC BLOOD PRESSURE: 140 MMHG | RESPIRATION RATE: 16 BRPM | HEART RATE: 102 BPM | OXYGEN SATURATION: 96 % | TEMPERATURE: 98.3 F | HEIGHT: 69 IN | WEIGHT: 256.8 LBS

## 2019-11-20 DIAGNOSIS — R53.83 FATIGUE, UNSPECIFIED TYPE: ICD-10-CM

## 2019-11-20 DIAGNOSIS — E78.5 HYPERLIPIDEMIA, UNSPECIFIED HYPERLIPIDEMIA TYPE: Primary | ICD-10-CM

## 2019-11-20 DIAGNOSIS — R73.09 ELEVATED GLUCOSE: ICD-10-CM

## 2019-11-20 DIAGNOSIS — R97.20 ELEVATED PSA: ICD-10-CM

## 2019-11-20 PROCEDURE — 99213 OFFICE O/P EST LOW 20 MIN: CPT | Performed by: FAMILY MEDICINE

## 2019-11-20 RX ORDER — SIMVASTATIN 10 MG/1
10 TABLET, FILM COATED ORAL NIGHTLY
Qty: 90 TABLET | Refills: 1 | Status: SHIPPED | OUTPATIENT
Start: 2019-11-20 | End: 2020-05-18

## 2019-11-20 ASSESSMENT — ENCOUNTER SYMPTOMS
VOMITING: 0
HEADACHES: 0
CHILLS: 0
FATIGUE: 1
SHORTNESS OF BREATH: 0
DYSURIA: 0
WEAKNESS: 0
HEMATURIA: 0
ABDOMINAL PAIN: 0
BLOOD IN STOOL: 0
DIZZINESS: 0
CONSTIPATION: 0
NAUSEA: 0
POLYPHAGIA: 0
WHEEZING: 0
NUMBNESS: 0
POLYDIPSIA: 0
COUGH: 0
DIARRHEA: 0
FEVER: 0

## 2019-11-20 NOTE — PROGRESS NOTES
"Subjective      Patient ID: Cesar Cruz is a 54 y.o. male.  1965      Med Refill   Associated symptoms include fatigue. Pertinent negatives include no abdominal pain, chest pain, chills, coughing, fever, headaches, nausea, numbness, rash, vomiting or weakness.       The following have been reviewed and updated as appropriate in this visit:  Problems       Review of Systems   Constitutional: Positive for fatigue. Negative for chills and fever.   HENT: Negative for dental problem and hearing loss.    Eyes: Negative for visual disturbance.   Respiratory: Negative for cough, shortness of breath and wheezing.    Cardiovascular: Negative for chest pain and leg swelling.   Gastrointestinal: Negative for abdominal pain, blood in stool, constipation, diarrhea, nausea and vomiting.   Endocrine: Negative for cold intolerance, heat intolerance, polydipsia, polyphagia and polyuria.   Genitourinary: Negative for discharge, dysuria and hematuria.   Musculoskeletal: Negative for gait problem.   Skin: Negative for rash.   Neurological: Negative for dizziness, weakness, numbness and headaches.   All other systems reviewed and are negative.      Objective     Vitals:    11/20/19 1534 11/20/19 1604   BP: (!) 142/78 140/82   Pulse: (!) 102    Resp: 16    Temp: 36.8 °C (98.3 °F)    TempSrc: Oral    SpO2: 96%    Weight: 116 kg (256 lb 12.8 oz)    Height: 1.753 m (5' 9\")      Body mass index is 37.92 kg/m².    Physical Exam   Constitutional: He is oriented to person, place, and time. He appears well-developed and well-nourished. No distress.   Eyes: No scleral icterus.   Cardiovascular: Normal rate, regular rhythm and normal heart sounds.   Pulmonary/Chest: Effort normal and breath sounds normal.   Musculoskeletal: He exhibits no edema.   Neurological: He is alert and oriented to person, place, and time.   gnf   Psychiatric: He has a normal mood and affect. His behavior is normal.   Vitals reviewed.      Assessment/Plan "   Diagnoses and all orders for this visit:    Hyperlipidemia, unspecified hyperlipidemia type (Primary)  -     CBC and differential; Future  -     Comprehensive metabolic panel; Future  -     Lipid panel; Future    Fatigue, unspecified type  -     CBC and differential; Future  -     TSH; Future    Elevated PSA  -     PSA; Future    Elevated glucose  -     Hemoglobin A1c; Future    Other orders  -     simvastatin (ZOCOR) 10 mg tablet; Take 1 tablet (10 mg total) by mouth nightly.

## 2019-12-11 ENCOUNTER — APPOINTMENT (OUTPATIENT)
Dept: LAB | Age: 54
End: 2019-12-11
Attending: FAMILY MEDICINE
Payer: COMMERCIAL

## 2019-12-11 DIAGNOSIS — E78.5 HYPERLIPIDEMIA, UNSPECIFIED HYPERLIPIDEMIA TYPE: ICD-10-CM

## 2019-12-11 DIAGNOSIS — R53.83 FATIGUE, UNSPECIFIED TYPE: ICD-10-CM

## 2019-12-11 DIAGNOSIS — R97.20 ELEVATED PSA: ICD-10-CM

## 2019-12-11 DIAGNOSIS — R73.09 ELEVATED GLUCOSE: ICD-10-CM

## 2019-12-11 LAB
ALBUMIN SERPL-MCNC: 3.8 G/DL (ref 3.4–5)
ALP SERPL-CCNC: 78 IU/L (ref 35–126)
ALT SERPL-CCNC: 34 IU/L (ref 16–63)
ANION GAP SERPL CALC-SCNC: 6 MEQ/L (ref 3–15)
AST SERPL-CCNC: 30 IU/L (ref 15–41)
BASOPHILS # BLD: 0.05 K/UL (ref 0.01–0.1)
BASOPHILS NFR BLD: 0.6 %
BILIRUB SERPL-MCNC: 0.8 MG/DL (ref 0.3–1.2)
BUN SERPL-MCNC: 12 MG/DL (ref 8–20)
CALCIUM SERPL-MCNC: 9.4 MG/DL (ref 8.9–10.3)
CHLORIDE SERPL-SCNC: 103 MEQ/L (ref 98–109)
CHOLEST SERPL-MCNC: 181 MG/DL
CO2 SERPL-SCNC: 28 MEQ/L (ref 22–32)
CREAT SERPL-MCNC: 1.1 MG/DL
DIFFERENTIAL METHOD BLD: NORMAL
EOSINOPHIL # BLD: 0.18 K/UL (ref 0.04–0.54)
EOSINOPHIL NFR BLD: 2.1 %
ERYTHROCYTE [DISTWIDTH] IN BLOOD BY AUTOMATED COUNT: 13.9 % (ref 11.6–14.4)
EST. AVERAGE GLUCOSE BLD GHB EST-MCNC: 114 MG/DL
GFR SERPL CREATININE-BSD FRML MDRD: >60 ML/MIN/1.73M*2
GLUCOSE SERPL-MCNC: 96 MG/DL (ref 70–99)
HBA1C MFR BLD HPLC: 5.6 %
HCT VFR BLDCO AUTO: 54.1 % (ref 40.1–51)
HDLC SERPL-MCNC: 44 MG/DL
HDLC SERPL: 4.1 {RATIO}
HGB BLD-MCNC: 16.9 G/DL
IMM GRANULOCYTES # BLD AUTO: 0.03 K/UL (ref 0–0.08)
IMM GRANULOCYTES NFR BLD AUTO: 0.4 %
LDLC SERPL CALC-MCNC: 107 MG/DL
LYMPHOCYTES # BLD: 2.64 K/UL (ref 1.2–3.5)
LYMPHOCYTES NFR BLD: 30.8 %
MCH RBC QN AUTO: 28.7 PG (ref 28–33.2)
MCHC RBC AUTO-ENTMCNC: 31.2 G/DL (ref 32.2–36.5)
MCV RBC AUTO: 92 FL (ref 83–98)
MONOCYTES # BLD: 0.9 K/UL (ref 0.3–1)
MONOCYTES NFR BLD: 10.5 %
NEUTROPHILS # BLD: 4.76 K/UL (ref 1.7–7)
NEUTS SEG NFR BLD: 55.6 %
NONHDLC SERPL-MCNC: 137 MG/DL
NRBC BLD-RTO: 0 %
PDW BLD AUTO: 10.3 FL (ref 9.4–12.4)
PLATELET # BLD AUTO: 264 K/UL
POTASSIUM SERPL-SCNC: 4.7 MEQ/L (ref 3.6–5.1)
PROT SERPL-MCNC: 7.1 G/DL (ref 6–8.2)
PSA SERPL-MCNC: 6.1 NG/ML
RBC # BLD AUTO: 5.88 M/UL (ref 4.5–5.8)
SODIUM SERPL-SCNC: 137 MEQ/L (ref 136–144)
TRIGL SERPL-MCNC: 148 MG/DL (ref 30–149)
TSH SERPL DL<=0.05 MIU/L-ACNC: 3.29 MIU/L (ref 0.34–5.6)
WBC # BLD AUTO: 8.56 K/UL

## 2019-12-11 PROCEDURE — 80061 LIPID PANEL: CPT

## 2019-12-11 PROCEDURE — 83036 HEMOGLOBIN GLYCOSYLATED A1C: CPT

## 2019-12-11 PROCEDURE — 84443 ASSAY THYROID STIM HORMONE: CPT

## 2019-12-11 PROCEDURE — 36415 COLL VENOUS BLD VENIPUNCTURE: CPT

## 2019-12-11 PROCEDURE — 84153 ASSAY OF PSA TOTAL: CPT

## 2019-12-11 PROCEDURE — 85025 COMPLETE CBC W/AUTO DIFF WBC: CPT

## 2019-12-11 PROCEDURE — 80053 COMPREHEN METABOLIC PANEL: CPT

## 2020-03-09 ENCOUNTER — OFFICE VISIT (OUTPATIENT)
Dept: PRIMARY CARE | Facility: CLINIC | Age: 55
End: 2020-03-09
Payer: COMMERCIAL

## 2020-03-09 VITALS
SYSTOLIC BLOOD PRESSURE: 134 MMHG | HEIGHT: 69 IN | DIASTOLIC BLOOD PRESSURE: 80 MMHG | RESPIRATION RATE: 16 BRPM | HEART RATE: 131 BPM | TEMPERATURE: 99.4 F | OXYGEN SATURATION: 96 % | WEIGHT: 247 LBS | BODY MASS INDEX: 36.58 KG/M2

## 2020-03-09 DIAGNOSIS — R97.20 ELEVATED PSA: ICD-10-CM

## 2020-03-09 DIAGNOSIS — R39.9 UTI SYMPTOMS: Primary | ICD-10-CM

## 2020-03-09 LAB
BILIRUBIN, POC: NEGATIVE
BLOOD URINE, POC: NORMAL
CLARITY, POC: CLEAR
COLOR, POC: YELLOW
GLUCOSE URINE, POC: NEGATIVE
KETONES, POC: NEGATIVE
LEUKOCYTE EST, POC: NEGATIVE
NITRITE, POC: NEGATIVE
PH, POC: 5.5
PROTEIN, POC: NORMAL
SPECIFIC GRAVITY, POC: 1.03
UROBILINOGEN, POC: 0.2

## 2020-03-09 PROCEDURE — 99214 OFFICE O/P EST MOD 30 MIN: CPT | Performed by: FAMILY MEDICINE

## 2020-03-09 PROCEDURE — 81002 URINALYSIS NONAUTO W/O SCOPE: CPT | Performed by: FAMILY MEDICINE

## 2020-03-09 RX ORDER — CIPROFLOXACIN 500 MG/1
500 TABLET ORAL 2 TIMES DAILY
Qty: 20 TABLET | Refills: 0 | Status: SHIPPED | OUTPATIENT
Start: 2020-03-09 | End: 2020-03-13

## 2020-03-09 RX ORDER — FINASTERIDE 5 MG/1
5 TABLET, FILM COATED ORAL DAILY
COMMUNITY
Start: 2020-01-20 | End: 2021-09-13

## 2020-03-09 ASSESSMENT — ENCOUNTER SYMPTOMS
FREQUENCY: 1
CONSTIPATION: 0
DIFFICULTY URINATING: 0
VOMITING: 0
CHILLS: 0
BLOOD IN STOOL: 0
DYSURIA: 1
POLYDIPSIA: 0
DIZZINESS: 0
FLANK PAIN: 1
SORE THROAT: 0
SWEATS: 0
DIARRHEA: 0
HEADACHES: 1
WEAKNESS: 0
SHORTNESS OF BREATH: 0
FEVER: 1
DIAPHORESIS: 0
WHEEZING: 0
NUMBNESS: 0
ABDOMINAL PAIN: 0
NAUSEA: 0
HEMATURIA: 0
COUGH: 0
POLYPHAGIA: 0

## 2020-03-09 NOTE — PROGRESS NOTES
"Subjective      Patient ID: Cesar Cruz is a 55 y.o. male.  1965      UTI    This is a new problem. The current episode started 1 to 4 weeks ago. The problem has been unchanged. The pain is mild. Associated symptoms include flank pain, frequency and hesitancy. Pertinent negatives include no chills, discharge, hematuria, nausea, sweats, urgency or vomiting. He has tried nothing for the symptoms.       The following have been reviewed and updated as appropriate in this visit:  Problems       Review of Systems   Constitutional: Positive for fever. Negative for chills and diaphoresis.   HENT: Negative for congestion, dental problem, hearing loss and sore throat.    Eyes: Negative for visual disturbance.   Respiratory: Negative for cough, shortness of breath and wheezing.    Cardiovascular: Negative for chest pain and leg swelling.   Gastrointestinal: Negative for abdominal pain, blood in stool, constipation, diarrhea, nausea and vomiting.   Endocrine: Negative for cold intolerance, heat intolerance, polydipsia, polyphagia and polyuria.   Genitourinary: Positive for dysuria, flank pain, frequency, hesitancy and penile pain. Negative for difficulty urinating, discharge, genital sores, hematuria, penile swelling, scrotal swelling, testicular pain and urgency.   Musculoskeletal: Negative for gait problem.   Skin: Negative for rash.   Neurological: Positive for headaches. Negative for dizziness, syncope, weakness and numbness.   All other systems reviewed and are negative.      Objective     Vitals:    03/09/20 1608   BP: 134/80   BP Location: Left upper arm   Patient Position: Sitting   Pulse: (!) 131   Resp: 16   Temp: 37.4 °C (99.4 °F)   TempSrc: Oral   SpO2: 96%   Weight: 112 kg (247 lb)   Height: 1.753 m (5' 9\")     Body mass index is 36.48 kg/m².    Physical Exam   Constitutional: He is oriented to person, place, and time. He appears well-developed and well-nourished. No distress.   HENT:   Head: " Normocephalic and atraumatic.   Right Ear: External ear normal.   Left Ear: External ear normal.   Nose: Nose normal.   Eyes: Pupils are equal, round, and reactive to light. Conjunctivae and EOM are normal. No scleral icterus.   Neck: Normal range of motion. Neck supple. No JVD present. No tracheal deviation present. No thyromegaly present.   Cardiovascular: Normal rate, regular rhythm and normal heart sounds. Exam reveals no gallop and no friction rub.   No murmur heard.  Pulmonary/Chest: Effort normal and breath sounds normal. No respiratory distress.   Abdominal: Soft. Bowel sounds are normal. He exhibits no distension and no mass. There is no hepatosplenomegaly. There is no tenderness. There is no rebound, no guarding and no CVA tenderness. No hernia. Hernia confirmed negative in the right inguinal area and confirmed negative in the left inguinal area.   Genitourinary: Testes normal and penis normal.   Musculoskeletal: Normal range of motion. He exhibits no edema or deformity.   Lymphadenopathy:     He has no cervical adenopathy. No inguinal adenopathy noted on the right or left side.   Neurological: He is alert and oriented to person, place, and time. He has normal strength.   gnf   Skin: Skin is warm and dry. No rash noted. He is not diaphoretic.   Psychiatric: He has a normal mood and affect. His behavior is normal.   Vitals reviewed.      Assessment/Plan   Diagnoses and all orders for this visit:    UTI symptoms (Primary)  Comments:  suspect prostatitis  u/a  cx  cipro    Elevated PSA  -     PSA; Future    Other orders  -     ciprofloxacin (CIPRO) 500 mg tablet; Take 1 tablet (500 mg total) by mouth 2 (two) times a day for 10 days.

## 2020-03-10 LAB — SPECIMEN STATUS: NORMAL

## 2020-03-13 ENCOUNTER — TELEPHONE (OUTPATIENT)
Dept: PRIMARY CARE | Facility: CLINIC | Age: 55
End: 2020-03-13

## 2020-03-13 RX ORDER — SULFAMETHOXAZOLE AND TRIMETHOPRIM 800; 160 MG/1; MG/1
1 TABLET ORAL 2 TIMES DAILY
Qty: 20 TABLET | Refills: 0 | Status: SHIPPED | OUTPATIENT
Start: 2020-03-13 | End: 2020-06-11

## 2020-03-13 RX ORDER — SULFAMETHOXAZOLE AND TRIMETHOPRIM 800; 160 MG/1; MG/1
1 TABLET ORAL 2 TIMES DAILY
Qty: 6 TABLET | Refills: 0 | Status: SHIPPED | OUTPATIENT
Start: 2020-03-13 | End: 2020-03-13

## 2020-03-13 NOTE — TELEPHONE ENCOUNTER
Shoulder popping out  At night  Want to change antibiotic.  If not  100 percent  Better day  8  Or 9 of   Bactrim  Call dr NOVA winter

## 2020-05-18 RX ORDER — SIMVASTATIN 10 MG/1
TABLET, FILM COATED ORAL
Qty: 90 TABLET | Refills: 1 | Status: SHIPPED | OUTPATIENT
Start: 2020-05-18 | End: 2020-10-19

## 2020-06-11 ENCOUNTER — TELEMEDICINE (OUTPATIENT)
Dept: PRIMARY CARE | Facility: CLINIC | Age: 55
End: 2020-06-11
Payer: COMMERCIAL

## 2020-06-11 DIAGNOSIS — Z20.822 EXPOSURE TO COVID-19 VIRUS: Primary | ICD-10-CM

## 2020-06-11 PROCEDURE — 99442 PR PHYS/QHP TELEPHONE EVALUATION 11-20 MIN: CPT | Mod: CS | Performed by: FAMILY MEDICINE

## 2020-06-11 ASSESSMENT — ENCOUNTER SYMPTOMS
FEVER: 0
DIZZINESS: 0
EYE DISCHARGE: 0
CHILLS: 0
COUGH: 0
DIARRHEA: 0
VOMITING: 0
RHINORRHEA: 0
SORE THROAT: 0
STRIDOR: 0
ABDOMINAL PAIN: 0
SHORTNESS OF BREATH: 0
WHEEZING: 0
HEADACHES: 0

## 2020-06-11 NOTE — PROGRESS NOTES
Verification of Patient Location:  The patient affirms they are currently located in the following state: [place]    Request for Consent:   Audio Only Encounter   You and I are about to have a telemedicine check-in or visit. This is allowed because you have requested it. This telemedicine visit will be billed to your health insurance or you, if you are self-insured. You understand you will be responsible for any copayments or coinsurances that apply to your telemedicine visit. Before starting our telemedicine visit, I am required to get your consent for this virtual check-in or visit by telemedicine. Do you consent?    Patient Response to Request for Consent:  Yes      Visit Documentation:  Subjective     Patient ID: Cesar Cruz is a 55 y.o. male.  1965      Pt requests covid test ab  Reports flu-like illness in       The following have been reviewed and updated as appropriate in this visit:  Allergies  Meds  Problems       Review of Systems   Constitutional: Negative for chills and fever.   HENT: Negative for congestion, ear discharge, ear pain, postnasal drip, rhinorrhea and sore throat.    Eyes: Negative for discharge.   Respiratory: Negative for cough, shortness of breath, wheezing and stridor.    Cardiovascular: Negative for chest pain and leg swelling.   Gastrointestinal: Negative for abdominal pain, diarrhea and vomiting.   Neurological: Negative for dizziness and headaches.         Assessment/Plan   Diagnoses and all orders for this visit:    Exposure to COVID-19 virus (Primary)  Comments:  lab  Orders:  -     Covid-19 SARS CoV-2 Antibody (IgG); Future        Time Spent in Medical Discussion During This Encounter:      Total encounter time, with >50 percent spent counseling/coordinatin minutes

## 2020-06-12 ENCOUNTER — TRANSCRIBE ORDERS (OUTPATIENT)
Dept: LAB | Age: 55
End: 2020-06-12

## 2020-06-12 ENCOUNTER — APPOINTMENT (OUTPATIENT)
Dept: LAB | Age: 55
End: 2020-06-12
Attending: FAMILY MEDICINE
Payer: COMMERCIAL

## 2020-06-12 DIAGNOSIS — R97.20 ELEVATED PROSTATE SPECIFIC ANTIGEN (PSA): ICD-10-CM

## 2020-06-12 DIAGNOSIS — Z20.822 EXPOSURE TO COVID-19 VIRUS: ICD-10-CM

## 2020-06-12 DIAGNOSIS — R97.20 ELEVATED PROSTATE SPECIFIC ANTIGEN (PSA): Primary | ICD-10-CM

## 2020-06-12 LAB
PSA FREE MFR SERPL: 17.29 %
PSA FREE SERPL-MCNC: 0.69 NG/ML
PSA SERPL-MCNC: 3.99 NG/ML

## 2020-06-12 PROCEDURE — 84154 ASSAY OF PSA FREE: CPT

## 2020-06-12 PROCEDURE — 36415 COLL VENOUS BLD VENIPUNCTURE: CPT

## 2020-06-12 PROCEDURE — 86769 SARS-COV-2 COVID-19 ANTIBODY: CPT

## 2020-06-13 LAB — SARS-COV-2 IGG SERPLBLD QL IA.RAPID: NEGATIVE

## 2020-10-19 RX ORDER — SIMVASTATIN 10 MG/1
TABLET, FILM COATED ORAL
Qty: 90 TABLET | Refills: 1 | Status: SHIPPED | OUTPATIENT
Start: 2020-10-19 | End: 2021-04-26

## 2021-03-19 ENCOUNTER — HOSPITAL ENCOUNTER (OUTPATIENT)
Facility: CLINIC | Age: 56
Discharge: HOME | End: 2021-03-19
Attending: INTERNAL MEDICINE
Payer: COMMERCIAL

## 2021-03-19 ENCOUNTER — APPOINTMENT (OUTPATIENT)
Dept: RADIOLOGY | Age: 56
End: 2021-03-19
Attending: INTERNAL MEDICINE
Payer: COMMERCIAL

## 2021-03-19 VITALS
DIASTOLIC BLOOD PRESSURE: 88 MMHG | OXYGEN SATURATION: 97 % | HEART RATE: 115 BPM | TEMPERATURE: 99.8 F | SYSTOLIC BLOOD PRESSURE: 142 MMHG

## 2021-03-19 DIAGNOSIS — M79.641 RIGHT HAND PAIN: Primary | ICD-10-CM

## 2021-03-19 PROCEDURE — 73130 X-RAY EXAM OF HAND: CPT | Mod: RT | Performed by: INTERNAL MEDICINE

## 2021-03-19 PROCEDURE — S9088 SERVICES PROVIDED IN URGENT: HCPCS | Performed by: INTERNAL MEDICINE

## 2021-03-19 PROCEDURE — 99213 OFFICE O/P EST LOW 20 MIN: CPT | Performed by: INTERNAL MEDICINE

## 2021-03-19 RX ORDER — METHYLPREDNISOLONE 4 MG/1
TABLET ORAL
Qty: 21 TABLET | Refills: 0 | Status: SHIPPED | OUTPATIENT
Start: 2021-03-19 | End: 2021-09-13

## 2021-03-19 RX ORDER — FINASTERIDE 5 MG/1
5 TABLET, FILM COATED ORAL DAILY
COMMUNITY
Start: 2021-01-07

## 2021-03-19 RX ORDER — TAMSULOSIN HYDROCHLORIDE 0.4 MG/1
0.4 CAPSULE ORAL
COMMUNITY
Start: 2021-01-07

## 2021-03-19 RX ORDER — FAMOTIDINE 20 MG/1
20 TABLET, FILM COATED ORAL DAILY
COMMUNITY
End: 2021-09-14 | Stop reason: SDUPTHER

## 2021-03-19 ASSESSMENT — ENCOUNTER SYMPTOMS
VOMITING: 0
TROUBLE SWALLOWING: 0
HEADACHES: 0
NAUSEA: 0
SHORTNESS OF BREATH: 0
FEVER: 0

## 2021-03-19 NOTE — DISCHARGE INSTRUCTIONS
In the office x-rays were performed showing some degenerative changes but no acute fracture or dislocation.  I will prescribe the steroid pack as we discussed.  I spoke with one of our hand surgeons, Dr. Tim Ibarra from [facility].  We are putting you in an ulnar gutter splint with the fingers in an extended position in particular the fourth.  Please leave this in place if you take it out for any reason ice it in 20-minute intervals and place it back in the splint wrapping from the fingers up the hand into the forearm.  Attached is the VIP patient scheduling card.  Please call either tomorrow or Monday morning and let the office know that you were seen in the [facility] urgent care and [facility], had x-rays completed, and were referred to the hand specialist and that the urgent care physician spoke directly with him and the need for follow-up in the office this week.

## 2021-03-19 NOTE — ED PROVIDER NOTES
History  No chief complaint on file.    Touched something on the back of the hand putting briefcase in the car        History provided by:  Patient   used: No    Hand Injury  Location:  Hand and finger  Finger location:  R ring finger  Injury: yes    Pain details:     Duration:  2 days  Handedness:  Right-handed  Relieved by:  NSAIDs (advil, aspercream)  Worsened by:  Stretching area (Pushin on distal palm above 4th digit, proximal  dorsal 4th digit)  Associated symptoms: swelling    Associated symptoms: no fever    Risk factors: no concern for non-accidental trauma        No past medical history on file.    No past surgical history on file.    Family History   Problem Relation Age of Onset   • Diabetes Biological Mother    • Multiple myeloma Biological Father        Social History     Tobacco Use   • Smoking status: Never Smoker   • Smokeless tobacco: Never Used   Substance Use Topics   • Alcohol use: Not on file   • Drug use: Not on file       Review of Systems   Constitutional: Negative for fever.   HENT: Negative for trouble swallowing.    Respiratory: Negative for shortness of breath.    Cardiovascular: Negative for chest pain.   Gastrointestinal: Negative for nausea and vomiting.   Skin: Negative for rash.   Neurological: Negative for headaches.       Physical Exam  ED Triage Vitals   Temp Pulse Resp BP SpO2   -- -- -- -- --      Temp src Heart Rate Source Patient Position BP Location FiO2 (%) (Set)   -- -- -- -- --       Physical Exam  Vitals signs reviewed.   Constitutional:       General: He is not in acute distress.     Appearance: He is not ill-appearing, toxic-appearing or diaphoretic.   Cardiovascular:      Rate and Rhythm: Normal rate and regular rhythm.      Heart sounds: No murmur.   Pulmonary:      Effort: No respiratory distress.      Breath sounds: No wheezing or rales.   Musculoskeletal:         General: Swelling and tenderness present.      Comments: Examination of the right  hand reveals tenderness over the proximal aspect of the fourth digit as well as tenderness on the palmar side of the hand at the distal aspect of the fifth metacarpal in near space between the fourth and fifth distal metacarpals, the fourth distal metacarpal.  There is a suggestion of some mild swelling along the palmar aspect distally in the area of tenderness.  Skin otherwise warm dry and intact.  There is flexion deformity of the fourth MCP joint with pain on passive extension.  Good cap refill and gross sensation distally.  Positive radial pulse.   Skin:     General: Skin is warm and dry.      Capillary Refill: Capillary refill takes less than 2 seconds.      Findings: No rash.   Neurological:      Mental Status: He is alert.   Psychiatric:         Mood and Affect: Mood normal.         Behavior: Behavior normal.           Procedures  Procedures    UC Course  Clinical Impressions as of Mar 19 1947   Right hand pain       MDM  Number of Diagnoses or Management Options  Right hand pain:   Diagnosis management comments: X-rays show some degenerative changes but no fracture or dislocation.  I did communicate with hand surgery and we placed him in an ulnar gutter splint and will have him call the office and follow-up with Dr. LING.  Please see disposition for full care plan.       Amount and/or Complexity of Data Reviewed  Tests in the radiology section of CPT®: ordered and reviewed                   Jg Rothman DO  03/19/21 1948

## 2021-03-31 DIAGNOSIS — Z23 ENCOUNTER FOR IMMUNIZATION: ICD-10-CM

## 2021-04-26 RX ORDER — SIMVASTATIN 10 MG/1
TABLET, FILM COATED ORAL
Qty: 90 TABLET | Refills: 0 | Status: SHIPPED | OUTPATIENT
Start: 2021-04-26 | End: 2021-07-19

## 2021-04-26 NOTE — TELEPHONE ENCOUNTER
Medicine Refill Request    Last Office Visit: 3/9/2020  Last Telemedicine Visit: 6/11/2020 Emil Coats MD    Next Office Visit: Visit date not found  Next Telemedicine Visit: Visit date not found         Current Outpatient Medications:   •  aspirin 81 mg enteric coated tablet, 81 mg., Disp: , Rfl:   •  famotidine (PEPCID) 20 mg tablet, Take 20 mg by mouth daily., Disp: , Rfl:   •  finasteride (PROSCAR) 5 mg tablet, Take 5 mg by mouth daily., Disp: , Rfl:   •  finasteride (PROSCAR) 5 mg tablet, Take 5 mg by mouth daily., Disp: , Rfl:   •  lidocaine (LIDODERM) 5 % patch, Apply 1 patch topically daily. Remove & discard patch within 12 hours or as directed by presscriber., Disp: 20 patch, Rfl: 0  •  methylPREDNISolone (MEDROL, GISSELLE,) 4 mg tablet, Follow package directions.  Over 6 days duration, Disp: 21 tablet, Rfl: 0  •  simvastatin (ZOCOR) 10 mg tablet, TAKE 1 TABLET BY MOUTH EVERY DAY AT NIGHT, Disp: 90 tablet, Rfl: 1  •  tamsulosin (FLOMAX) 0.4 mg capsule, Take 1 capsule (0.4 mg total) by mouth nightly., Disp: 90 capsule, Rfl: 1  •  tamsulosin (FLOMAX) 0.4 mg capsule, Take 0.4 mg by mouth., Disp: , Rfl:       BP Readings from Last 3 Encounters:   03/19/21 (!) 142/88   03/09/20 134/80   11/20/19 140/82       Recent Lab results:  Lab Results   Component Value Date    CHOL 181 12/11/2019   ,   Lab Results   Component Value Date    HDL 44 (L) 12/11/2019   ,   Lab Results   Component Value Date    LDLCALC 107 (H) 12/11/2019   ,   Lab Results   Component Value Date    TRIG 148 12/11/2019        Lab Results   Component Value Date    GLUCOSE 96 12/11/2019   ,   Lab Results   Component Value Date    HGBA1C 5.6 12/11/2019         Lab Results   Component Value Date    CREATININE 1.1 12/11/2019       Lab Results   Component Value Date    TSH 3.29 12/11/2019

## 2021-07-19 RX ORDER — SIMVASTATIN 10 MG/1
TABLET, FILM COATED ORAL
Qty: 30 TABLET | Refills: 0 | Status: SHIPPED | OUTPATIENT
Start: 2021-07-19 | End: 2021-08-17

## 2021-07-19 NOTE — TELEPHONE ENCOUNTER
Medicine Refill Request    Last Office Visit: 3/9/2020  Last Telemedicine Visit: 6/11/2020 Emil Coats MD    Next Office Visit: Visit date not found  Next Telemedicine Visit: Visit date not found         Current Outpatient Medications:   •  aspirin 81 mg enteric coated tablet, 81 mg., Disp: , Rfl:   •  famotidine (PEPCID) 20 mg tablet, Take 20 mg by mouth daily., Disp: , Rfl:   •  finasteride (PROSCAR) 5 mg tablet, Take 5 mg by mouth daily., Disp: , Rfl:   •  finasteride (PROSCAR) 5 mg tablet, Take 5 mg by mouth daily., Disp: , Rfl:   •  lidocaine (LIDODERM) 5 % patch, Apply 1 patch topically daily. Remove & discard patch within 12 hours or as directed by presscriber., Disp: 20 patch, Rfl: 0  •  methylPREDNISolone (MEDROL, GISSELLE,) 4 mg tablet, Follow package directions.  Over 6 days duration, Disp: 21 tablet, Rfl: 0  •  simvastatin (ZOCOR) 10 mg tablet, TAKE 1 TABLET BY MOUTH EVERY DAY AT NIGHT, Disp: 90 tablet, Rfl: 0  •  tamsulosin (FLOMAX) 0.4 mg capsule, Take 1 capsule (0.4 mg total) by mouth nightly., Disp: 90 capsule, Rfl: 1  •  tamsulosin (FLOMAX) 0.4 mg capsule, Take 0.4 mg by mouth., Disp: , Rfl:       BP Readings from Last 3 Encounters:   03/19/21 (!) 142/88   03/09/20 134/80   11/20/19 140/82       Recent Lab results:  Lab Results   Component Value Date    CHOL 181 12/11/2019   ,   Lab Results   Component Value Date    HDL 44 (L) 12/11/2019   ,   Lab Results   Component Value Date    LDLCALC 107 (H) 12/11/2019   ,   Lab Results   Component Value Date    TRIG 148 12/11/2019        Lab Results   Component Value Date    GLUCOSE 96 12/11/2019   ,   Lab Results   Component Value Date    HGBA1C 5.6 12/11/2019         Lab Results   Component Value Date    CREATININE 1.1 12/11/2019       Lab Results   Component Value Date    TSH 3.29 12/11/2019

## 2021-07-30 ENCOUNTER — HOSPITAL ENCOUNTER (OUTPATIENT)
Facility: CLINIC | Age: 56
Discharge: HOME | End: 2021-07-30
Attending: EMERGENCY MEDICINE
Payer: COMMERCIAL

## 2021-07-30 VITALS
SYSTOLIC BLOOD PRESSURE: 130 MMHG | TEMPERATURE: 99 F | HEART RATE: 101 BPM | DIASTOLIC BLOOD PRESSURE: 84 MMHG | OXYGEN SATURATION: 95 %

## 2021-07-30 DIAGNOSIS — J06.9 UPPER RESPIRATORY TRACT INFECTION, UNSPECIFIED TYPE: Primary | ICD-10-CM

## 2021-07-30 LAB — S PYO AG THROAT QL: NEGATIVE

## 2021-07-30 PROCEDURE — S9088 SERVICES PROVIDED IN URGENT: HCPCS | Performed by: NURSE PRACTITIONER

## 2021-07-30 PROCEDURE — 99213 OFFICE O/P EST LOW 20 MIN: CPT | Performed by: NURSE PRACTITIONER

## 2021-07-30 PROCEDURE — 87880 STREP A ASSAY W/OPTIC: CPT | Performed by: NURSE PRACTITIONER

## 2021-07-30 RX ORDER — DOXYCYCLINE 100 MG/1
100 CAPSULE ORAL 2 TIMES DAILY
Qty: 20 CAPSULE | Refills: 0 | Status: SHIPPED | OUTPATIENT
Start: 2021-07-30 | End: 2021-08-09

## 2021-07-30 ASSESSMENT — ENCOUNTER SYMPTOMS
HEMATOLOGIC/LYMPHATIC NEGATIVE: 1
CHEST TIGHTNESS: 0
FATIGUE: 1
EYES NEGATIVE: 1
RHINORRHEA: 0
DIARRHEA: 0
ALLERGIC/IMMUNOLOGIC NEGATIVE: 1
DIZZINESS: 0
PSYCHIATRIC NEGATIVE: 1
ENDOCRINE NEGATIVE: 1
WEAKNESS: 0
CHILLS: 0
LIGHT-HEADEDNESS: 0
MYALGIAS: 0
FEVER: 1
SINUS PRESSURE: 1
NAUSEA: 0
NUMBNESS: 0
VOMITING: 0
COUGH: 1
HEADACHES: 1
ABDOMINAL PAIN: 0
WHEEZING: 0
VOICE CHANGE: 1
SHORTNESS OF BREATH: 0
SORE THROAT: 1

## 2021-07-30 NOTE — ED PROVIDER NOTES
Emergency Medicine Note  HPI   HISTORY OF PRESENT ILLNESS     Patient presents with sore throat, sinus pressure, left ear pain and low grade fever since Tuesday.   Tried Advil and Mucinex DM with some relief.   Second COVID vaccine 4/24.  He reports being in a [place]ed area this past weekend where he may have be exposed to ticks.  He does have a history of severe Lyme Disease infection in the past.            Patient History   PAST HISTORY     Reviewed from Nursing Triage:      No past medical history on file.    No past surgical history on file.    Family History   Problem Relation Age of Onset   • Diabetes Biological Mother    • Multiple myeloma Biological Father        Social History     Tobacco Use   • Smoking status: Never Smoker   • Smokeless tobacco: Never Used   Substance Use Topics   • Alcohol use: Not on file   • Drug use: Not on file         Review of Systems   REVIEW OF SYSTEMS     Review of Systems   Constitutional: Positive for fatigue and fever. Negative for chills.   HENT: Positive for congestion, ear pain, postnasal drip, sinus pressure, sore throat and voice change. Negative for rhinorrhea and sneezing.    Eyes: Negative.    Respiratory: Positive for cough. Negative for chest tightness, shortness of breath and wheezing.    Cardiovascular: Negative for chest pain.   Gastrointestinal: Negative for abdominal pain, diarrhea, nausea and vomiting.   Endocrine: Negative.    Genitourinary: Negative.    Musculoskeletal: Negative for myalgias.   Skin: Negative for rash.   Allergic/Immunologic: Negative.    Neurological: Positive for headaches. Negative for dizziness, weakness, light-headedness and numbness.   Hematological: Negative.    Psychiatric/Behavioral: Negative.          VITALS     ED Vitals    Date/Time Temp Pulse Resp BP SpO2 [facility]   07/30/21 0951 37.2 °C (99 °F) 101 -- 130/84 95 % TR                       Physical Exam   PHYSICAL EXAM     Physical Exam  Vitals reviewed.   Constitutional:        General: He is not in acute distress.     Appearance: He is well-developed. He is ill-appearing. He is not toxic-appearing or diaphoretic.   HENT:      Head: Normocephalic and atraumatic.      Left Ear: A middle ear effusion is present.      Nose: Congestion present.      Mouth/Throat:      Mouth: Mucous membranes are dry.      Pharynx: Oropharynx is clear. Uvula midline. Posterior oropharyngeal erythema present. No oropharyngeal exudate or uvula swelling.      Tonsils: 1+ on the right. 1+ on the left.   Cardiovascular:      Rate and Rhythm: Tachycardia present.      Heart sounds: Normal heart sounds.   Pulmonary:      Effort: Pulmonary effort is normal. No respiratory distress.      Breath sounds: Normal breath sounds. No wheezing or rales.   Musculoskeletal:      Cervical back: Normal range of motion.   Skin:     General: Skin is warm and dry.   Neurological:      General: No focal deficit present.      Mental Status: He is alert and oriented to person, place, and time.   Psychiatric:         Mood and Affect: Mood normal.         Behavior: Behavior normal.           PROCEDURES     Procedures     DATA     Results     None              No orders to display       Scoring tools                                 ED Course & MDM   MDM / ED COURSE and CLINICAL IMPRESSIONS     MDM  Number of Diagnoses or Management Options  Upper respiratory tract infection, unspecified type  Diagnosis management comments: Rapid strep negative.  Feels similar to his last Lyme exposure.   Sent script for Doxycycline to the requested pharmacy.   Discuss skin precautions in the sun.   Continue with OTC supportive care as needed.  Instructed to follow up with PCP or ED for new or worsening symptoms.         Clinical Impressions as of Jul 30 1011   Upper respiratory tract infection, unspecified type            Yolande Gonzalez CRNP  07/30/21 1024

## 2021-07-30 NOTE — ED ATTESTATION NOTE
I was present in the office and provided direct supervison.     Steve Connelly MD  07/30/21 2798

## 2021-08-17 RX ORDER — SIMVASTATIN 10 MG/1
TABLET, FILM COATED ORAL
Qty: 30 TABLET | Refills: 0 | Status: SHIPPED | OUTPATIENT
Start: 2021-08-17 | End: 2021-09-09

## 2021-08-17 NOTE — TELEPHONE ENCOUNTER
Medicine Refill Request    Last Office Visit: 3/9/2020  Last Telemedicine Visit: 6/11/2020 Emil Coats MD    Next Office Visit: 9/14/2021  Next Telemedicine Visit: Visit date not found         Current Outpatient Medications:   •  aspirin 81 mg enteric coated tablet, 81 mg., Disp: , Rfl:   •  famotidine (PEPCID) 20 mg tablet, Take 20 mg by mouth daily., Disp: , Rfl:   •  finasteride (PROSCAR) 5 mg tablet, Take 5 mg by mouth daily., Disp: , Rfl:   •  finasteride (PROSCAR) 5 mg tablet, Take 5 mg by mouth daily., Disp: , Rfl:   •  lidocaine (LIDODERM) 5 % patch, Apply 1 patch topically daily. Remove & discard patch within 12 hours or as directed by presscriber., Disp: 20 patch, Rfl: 0  •  methylPREDNISolone (MEDROL, GISSELLE,) 4 mg tablet, Follow package directions.  Over 6 days duration, Disp: 21 tablet, Rfl: 0  •  simvastatin (ZOCOR) 10 mg tablet, TAKE 1 TABLET BY MOUTH EVERY DAY AT NIGHT, Disp: 30 tablet, Rfl: 0  •  tamsulosin (FLOMAX) 0.4 mg capsule, Take 1 capsule (0.4 mg total) by mouth nightly., Disp: 90 capsule, Rfl: 1  •  tamsulosin (FLOMAX) 0.4 mg capsule, Take 0.4 mg by mouth., Disp: , Rfl:       BP Readings from Last 3 Encounters:   07/30/21 130/84   03/19/21 (!) 142/88   03/09/20 134/80       Recent Lab results:  Lab Results   Component Value Date    CHOL 181 12/11/2019   ,   Lab Results   Component Value Date    HDL 44 (L) 12/11/2019   ,   Lab Results   Component Value Date    LDLCALC 107 (H) 12/11/2019   ,   Lab Results   Component Value Date    TRIG 148 12/11/2019        Lab Results   Component Value Date    GLUCOSE 96 12/11/2019   ,   Lab Results   Component Value Date    HGBA1C 5.6 12/11/2019         Lab Results   Component Value Date    CREATININE 1.1 12/11/2019       Lab Results   Component Value Date    TSH 3.29 12/11/2019

## 2021-09-09 RX ORDER — SIMVASTATIN 10 MG/1
TABLET, FILM COATED ORAL
Qty: 30 TABLET | Refills: 0 | Status: SHIPPED | OUTPATIENT
Start: 2021-09-09 | End: 2021-09-14 | Stop reason: SDUPTHER

## 2021-09-14 ENCOUNTER — OFFICE VISIT (OUTPATIENT)
Dept: PRIMARY CARE | Facility: CLINIC | Age: 56
End: 2021-09-14
Payer: COMMERCIAL

## 2021-09-14 ENCOUNTER — APPOINTMENT (OUTPATIENT)
Dept: LAB | Age: 56
End: 2021-09-14
Attending: FAMILY MEDICINE
Payer: COMMERCIAL

## 2021-09-14 VITALS
TEMPERATURE: 98.1 F | WEIGHT: 258 LBS | HEART RATE: 104 BPM | DIASTOLIC BLOOD PRESSURE: 85 MMHG | BODY MASS INDEX: 39.1 KG/M2 | SYSTOLIC BLOOD PRESSURE: 135 MMHG | RESPIRATION RATE: 18 BRPM | OXYGEN SATURATION: 97 % | HEIGHT: 68 IN

## 2021-09-14 DIAGNOSIS — R73.09 ELEVATED GLUCOSE: ICD-10-CM

## 2021-09-14 DIAGNOSIS — E78.5 HYPERLIPIDEMIA, UNSPECIFIED HYPERLIPIDEMIA TYPE: ICD-10-CM

## 2021-09-14 DIAGNOSIS — E78.5 HYPERLIPIDEMIA, UNSPECIFIED HYPERLIPIDEMIA TYPE: Primary | ICD-10-CM

## 2021-09-14 LAB
ALBUMIN SERPL-MCNC: 3.7 G/DL (ref 3.4–5)
ALP SERPL-CCNC: 77 IU/L (ref 35–126)
ALT SERPL-CCNC: 31 IU/L (ref 16–63)
ANION GAP SERPL CALC-SCNC: 10 MEQ/L (ref 3–15)
AST SERPL-CCNC: 28 IU/L (ref 15–41)
BASOPHILS # BLD: 0.04 K/UL (ref 0.01–0.1)
BASOPHILS NFR BLD: 0.5 %
BILIRUB SERPL-MCNC: 0.7 MG/DL (ref 0.3–1.2)
BUN SERPL-MCNC: 16 MG/DL (ref 8–20)
CALCIUM SERPL-MCNC: 9.4 MG/DL (ref 8.9–10.3)
CHLORIDE SERPL-SCNC: 103 MEQ/L (ref 98–109)
CHOLEST SERPL-MCNC: 173 MG/DL
CO2 SERPL-SCNC: 25 MEQ/L (ref 22–32)
CREAT SERPL-MCNC: 1.2 MG/DL (ref 0.8–1.3)
DIFFERENTIAL METHOD BLD: NORMAL
EOSINOPHIL # BLD: 0.09 K/UL (ref 0.04–0.54)
EOSINOPHIL NFR BLD: 1.1 %
ERYTHROCYTE [DISTWIDTH] IN BLOOD BY AUTOMATED COUNT: 14 % (ref 11.6–14.4)
EST. AVERAGE GLUCOSE BLD GHB EST-MCNC: 120 MG/DL
GFR SERPL CREATININE-BSD FRML MDRD: >60 ML/MIN/1.73M*2
GLUCOSE SERPL-MCNC: 89 MG/DL (ref 70–99)
HBA1C MFR BLD HPLC: 5.8 %
HCT VFR BLDCO AUTO: 50.3 % (ref 40.1–51)
HDLC SERPL-MCNC: 44 MG/DL
HDLC SERPL: 3.9 {RATIO}
HGB BLD-MCNC: 16.4 G/DL (ref 13.7–17.5)
IMM GRANULOCYTES # BLD AUTO: 0.03 K/UL (ref 0–0.08)
IMM GRANULOCYTES NFR BLD AUTO: 0.4 %
LDLC SERPL CALC-MCNC: 106 MG/DL
LYMPHOCYTES # BLD: 2.04 K/UL (ref 1.2–3.5)
LYMPHOCYTES NFR BLD: 26 %
MCH RBC QN AUTO: 28.9 PG (ref 28–33.2)
MCHC RBC AUTO-ENTMCNC: 32.6 G/DL (ref 32.2–36.5)
MCV RBC AUTO: 88.6 FL (ref 83–98)
MONOCYTES # BLD: 0.9 K/UL (ref 0.3–1)
MONOCYTES NFR BLD: 11.5 %
NEUTROPHILS # BLD: 4.75 K/UL (ref 1.7–7)
NEUTS SEG NFR BLD: 60.5 %
NONHDLC SERPL-MCNC: 129 MG/DL
NRBC BLD-RTO: 0 %
PDW BLD AUTO: 10 FL (ref 9.4–12.4)
PLATELET # BLD AUTO: 230 K/UL (ref 150–350)
POTASSIUM SERPL-SCNC: 4.6 MEQ/L (ref 3.6–5.1)
PROT SERPL-MCNC: 6.6 G/DL (ref 6–8.2)
RBC # BLD AUTO: 5.68 M/UL (ref 4.5–5.8)
SODIUM SERPL-SCNC: 138 MEQ/L (ref 136–144)
TRIGL SERPL-MCNC: 115 MG/DL (ref 30–149)
WBC # BLD AUTO: 7.85 K/UL (ref 3.8–10.5)

## 2021-09-14 PROCEDURE — 83036 HEMOGLOBIN GLYCOSYLATED A1C: CPT

## 2021-09-14 PROCEDURE — 90472 IMMUNIZATION ADMIN EACH ADD: CPT | Performed by: FAMILY MEDICINE

## 2021-09-14 PROCEDURE — 85025 COMPLETE CBC W/AUTO DIFF WBC: CPT

## 2021-09-14 PROCEDURE — 3008F BODY MASS INDEX DOCD: CPT | Performed by: FAMILY MEDICINE

## 2021-09-14 PROCEDURE — 36415 COLL VENOUS BLD VENIPUNCTURE: CPT

## 2021-09-14 PROCEDURE — 90750 HZV VACC RECOMBINANT IM: CPT | Performed by: FAMILY MEDICINE

## 2021-09-14 PROCEDURE — 80053 COMPREHEN METABOLIC PANEL: CPT

## 2021-09-14 PROCEDURE — 99214 OFFICE O/P EST MOD 30 MIN: CPT | Mod: 25 | Performed by: FAMILY MEDICINE

## 2021-09-14 PROCEDURE — 90686 IIV4 VACC NO PRSV 0.5 ML IM: CPT | Performed by: FAMILY MEDICINE

## 2021-09-14 PROCEDURE — 80061 LIPID PANEL: CPT

## 2021-09-14 PROCEDURE — 90471 IMMUNIZATION ADMIN: CPT | Performed by: FAMILY MEDICINE

## 2021-09-14 RX ORDER — FAMOTIDINE 20 MG/1
20 TABLET, FILM COATED ORAL DAILY
Qty: 90 TABLET | Refills: 3 | Status: SHIPPED | OUTPATIENT
Start: 2021-09-14 | End: 2022-07-12

## 2021-09-14 RX ORDER — SIMVASTATIN 10 MG/1
10 TABLET, FILM COATED ORAL NIGHTLY
Qty: 90 TABLET | Refills: 3 | Status: SHIPPED | OUTPATIENT
Start: 2021-09-14 | End: 2022-07-12

## 2021-09-14 ASSESSMENT — ENCOUNTER SYMPTOMS
DYSPHORIC MOOD: 0
VOMITING: 0
HEADACHES: 0
NAUSEA: 0
PALPITATIONS: 0
BLOOD IN STOOL: 0
FEVER: 0
POLYDIPSIA: 0
HEMATURIA: 0
CHILLS: 0
CONSTIPATION: 0
COUGH: 0
WEAKNESS: 0
STRIDOR: 0
WHEEZING: 0
DIZZINESS: 0
DYSURIA: 0
ABDOMINAL PAIN: 0
NUMBNESS: 0
POLYPHAGIA: 0
DIARRHEA: 0
SHORTNESS OF BREATH: 0

## 2021-09-14 ASSESSMENT — PATIENT HEALTH QUESTIONNAIRE - PHQ9: SUM OF ALL RESPONSES TO PHQ9 QUESTIONS 1 & 2: 0

## 2021-09-14 NOTE — PROGRESS NOTES
"      Subjective      Patient ID: Cesar Cruz is a 56 y.o. male.  1965      Med ck      The following have been reviewed and updated as appropriate in this visit:  Allergies  Meds  Problems       Review of Systems   Constitutional: Negative for chills and fever.   HENT: Negative for dental problem and hearing loss.    Eyes: Negative for visual disturbance.   Respiratory: Negative for cough, shortness of breath, wheezing and stridor.    Cardiovascular: Negative for chest pain, palpitations and leg swelling.   Gastrointestinal: Negative for abdominal pain, blood in stool, constipation, diarrhea, nausea and vomiting.   Endocrine: Negative for cold intolerance, heat intolerance, polydipsia, polyphagia and polyuria.   Genitourinary: Negative for discharge, dysuria and hematuria.   Musculoskeletal: Negative for gait problem.   Skin: Negative for rash.   Neurological: Negative for dizziness, syncope, weakness, numbness and headaches.   Psychiatric/Behavioral: Negative for dysphoric mood.   All other systems reviewed and are negative.      Objective     Vitals:    09/14/21 0933 09/14/21 1000   BP: (!) 142/90 135/85   BP Location: Left upper arm    Patient Position: Sitting    Pulse: (!) 104    Resp: 18    Temp: 36.7 °C (98.1 °F)    TempSrc: Temporal    SpO2: 97%    Weight: 117 kg (258 lb)    Height: 1.727 m (5' 8\")      Body mass index is 39.23 kg/m².    Physical Exam  Vitals and nursing note reviewed.   Constitutional:       Appearance: Normal appearance.   Eyes:      General: No scleral icterus.  Cardiovascular:      Rate and Rhythm: Normal rate and regular rhythm.      Heart sounds: Normal heart sounds.   Pulmonary:      Effort: Pulmonary effort is normal.      Breath sounds: Normal breath sounds.   Musculoskeletal:      Right lower leg: No edema.      Left lower leg: No edema.   Skin:     General: Skin is warm and dry.      Findings: No rash.   Neurological:      General: No focal deficit present.      " Mental Status: He is alert.   Psychiatric:         Mood and Affect: Mood normal.         Behavior: Behavior normal.         Assessment/Plan   Diagnoses and all orders for this visit:    Hyperlipidemia, unspecified hyperlipidemia type (Primary)  Comments:  labs  wt loss  Orders:  -     CBC and differential; Future  -     Comprehensive metabolic panel; Future  -     Lipid panel; Future    Elevated glucose  Comments:  lab  low carb diet,exercise  Orders:  -     CBC and differential; Future  -     Comprehensive metabolic panel; Future  -     Hemoglobin A1c; Future    Other orders  -     simvastatin (ZOCOR) 10 mg tablet; Take 1 tablet (10 mg total) by mouth nightly.  -     famotidine (PEPCID) 20 mg tablet; Take 1 tablet (20 mg total) by mouth daily.  -     Influenza vaccine quadrivalent preservative free 6 mon and older IM (FluLaval)  -     Shingrix

## 2021-09-16 ENCOUNTER — TELEPHONE (OUTPATIENT)
Dept: PRIMARY CARE | Facility: CLINIC | Age: 56
End: 2021-09-16

## 2021-09-16 NOTE — TELEPHONE ENCOUNTER
Pharmacy sent in a request fro a different medication for pt's famotidine 20 mg tablets. They are requesting an alternative. Please advise       Medicine Refill Request    Last Office Visit: 9/14/2021  Last Telemedicine Visit: 6/11/2020 Emil Coats MD    Next Office Visit: 11/17/2021  Next Telemedicine Visit: Visit date not found         Current Outpatient Medications:   •  aspirin 81 mg enteric coated tablet, 81 mg., Disp: , Rfl:   •  famotidine (PEPCID) 20 mg tablet, Take 1 tablet (20 mg total) by mouth daily., Disp: 90 tablet, Rfl: 3  •  finasteride (PROSCAR) 5 mg tablet, Take 5 mg by mouth daily., Disp: , Rfl:   •  lidocaine (LIDODERM) 5 % patch, Apply 1 patch topically daily. Remove & discard patch within 12 hours or as directed by presscriber., Disp: 20 patch, Rfl: 0  •  simvastatin (ZOCOR) 10 mg tablet, Take 1 tablet (10 mg total) by mouth nightly., Disp: 90 tablet, Rfl: 3  •  tamsulosin (FLOMAX) 0.4 mg capsule, Take 0.4 mg by mouth., Disp: , Rfl:       BP Readings from Last 3 Encounters:   09/14/21 135/85   07/30/21 130/84   03/19/21 (!) 142/88       Recent Lab results:  Lab Results   Component Value Date    CHOL 173 09/14/2021   ,   Lab Results   Component Value Date    HDL 44 (L) 09/14/2021   ,   Lab Results   Component Value Date    LDLCALC 106 (H) 09/14/2021   ,   Lab Results   Component Value Date    TRIG 115 09/14/2021        Lab Results   Component Value Date    GLUCOSE 89 09/14/2021   ,   Lab Results   Component Value Date    HGBA1C 5.8 (H) 09/14/2021         Lab Results   Component Value Date    CREATININE 1.2 09/14/2021       Lab Results   Component Value Date    TSH 3.29 12/11/2019

## 2021-11-05 ENCOUNTER — TELEPHONE (OUTPATIENT)
Dept: PRIMARY CARE | Facility: CLINIC | Age: 56
End: 2021-11-05

## 2021-11-17 ENCOUNTER — CLINICAL SUPPORT (OUTPATIENT)
Dept: PRIMARY CARE | Facility: CLINIC | Age: 56
End: 2021-11-17
Payer: COMMERCIAL

## 2021-11-17 PROCEDURE — 90471 IMMUNIZATION ADMIN: CPT | Performed by: FAMILY MEDICINE

## 2021-11-17 PROCEDURE — 90750 HZV VACC RECOMBINANT IM: CPT | Performed by: FAMILY MEDICINE

## 2021-11-27 PROCEDURE — 99284 EMERGENCY DEPT VISIT MOD MDM: CPT | Mod: 25

## 2021-11-27 PROCEDURE — 93005 ELECTROCARDIOGRAM TRACING: CPT

## 2021-11-27 PROCEDURE — 93005 ELECTROCARDIOGRAM TRACING: CPT | Performed by: EMERGENCY MEDICINE

## 2021-11-28 ENCOUNTER — APPOINTMENT (EMERGENCY)
Dept: RADIOLOGY | Facility: HOSPITAL | Age: 56
End: 2021-11-28
Payer: COMMERCIAL

## 2021-11-28 ENCOUNTER — HOSPITAL ENCOUNTER (EMERGENCY)
Facility: HOSPITAL | Age: 56
Discharge: HOME | End: 2021-11-28
Attending: EMERGENCY MEDICINE
Payer: COMMERCIAL

## 2021-11-28 VITALS
BODY MASS INDEX: 36.46 KG/M2 | SYSTOLIC BLOOD PRESSURE: 131 MMHG | WEIGHT: 254.7 LBS | HEIGHT: 70 IN | DIASTOLIC BLOOD PRESSURE: 91 MMHG | RESPIRATION RATE: 16 BRPM | HEART RATE: 74 BPM | TEMPERATURE: 97.9 F | OXYGEN SATURATION: 94 %

## 2021-11-28 DIAGNOSIS — R00.0 SINUS TACHYCARDIA: Primary | ICD-10-CM

## 2021-11-28 LAB
ALBUMIN SERPL-MCNC: 3.9 G/DL (ref 3.4–5)
ALP SERPL-CCNC: 74 IU/L (ref 35–126)
ALT SERPL-CCNC: 31 IU/L (ref 16–63)
ANION GAP SERPL CALC-SCNC: 9 MEQ/L (ref 3–15)
APTT PPP: 29 SEC (ref 23–35)
AST SERPL-CCNC: 26 IU/L (ref 15–41)
ATRIAL RATE: 123
BASOPHILS # BLD: 0.07 K/UL (ref 0.01–0.1)
BASOPHILS NFR BLD: 0.6 %
BILIRUB SERPL-MCNC: 0.5 MG/DL (ref 0.3–1.2)
BUN SERPL-MCNC: 23 MG/DL (ref 8–20)
CALCIUM SERPL-MCNC: 9.2 MG/DL (ref 8.9–10.3)
CHLORIDE SERPL-SCNC: 102 MEQ/L (ref 98–109)
CO2 SERPL-SCNC: 24 MEQ/L (ref 22–32)
CREAT SERPL-MCNC: 1.3 MG/DL (ref 0.8–1.3)
D DIMER PPP IA.FEU-MCNC: 0.5 UG/ML FEU (ref 0–0.5)
DIFFERENTIAL METHOD BLD: ABNORMAL
EOSINOPHIL # BLD: 0.07 K/UL (ref 0.04–0.54)
EOSINOPHIL NFR BLD: 0.6 %
ERYTHROCYTE [DISTWIDTH] IN BLOOD BY AUTOMATED COUNT: 14.1 % (ref 11.6–14.4)
GFR SERPL CREATININE-BSD FRML MDRD: 56.9 ML/MIN/1.73M*2
GLUCOSE SERPL-MCNC: 120 MG/DL (ref 70–99)
HCT VFR BLDCO AUTO: 52.7 % (ref 40.1–51)
HGB BLD-MCNC: 16.6 G/DL (ref 13.7–17.5)
IMM GRANULOCYTES # BLD AUTO: 0.06 K/UL (ref 0–0.08)
IMM GRANULOCYTES NFR BLD AUTO: 0.5 %
INR PPP: 1.1
LYMPHOCYTES # BLD: 2.22 K/UL (ref 1.2–3.5)
LYMPHOCYTES NFR BLD: 20.1 %
MCH RBC QN AUTO: 28.9 PG (ref 28–33.2)
MCHC RBC AUTO-ENTMCNC: 31.5 G/DL (ref 32.2–36.5)
MCV RBC AUTO: 91.7 FL (ref 83–98)
MONOCYTES # BLD: 1.23 K/UL (ref 0.3–1)
MONOCYTES NFR BLD: 11.1 %
NEUTROPHILS # BLD: 7.41 K/UL (ref 1.7–7)
NEUTS SEG NFR BLD: 67.1 %
NRBC BLD-RTO: 0 %
P AXIS: 33
PDW BLD AUTO: 9.5 FL (ref 9.4–12.4)
PLATELET # BLD AUTO: 218 K/UL (ref 150–350)
POTASSIUM SERPL-SCNC: 3.8 MEQ/L (ref 3.6–5.1)
PR INTERVAL: 152
PROT SERPL-MCNC: 7.5 G/DL (ref 6–8.2)
PROTHROMBIN TIME: 14.1 SEC (ref 12.2–14.5)
QRS DURATION: 86
QT INTERVAL: 302
QTC CALCULATION(BAZETT): 432
R AXIS: 44
RBC # BLD AUTO: 5.75 M/UL (ref 4.5–5.8)
SODIUM SERPL-SCNC: 135 MEQ/L (ref 136–144)
T WAVE AXIS: 7
TROPONIN I SERPL-MCNC: <0.02 NG/ML
TROPONIN I SERPL-MCNC: <0.02 NG/ML
TSH SERPL DL<=0.05 MIU/L-ACNC: 3.75 MIU/L (ref 0.34–5.6)
VENTRICULAR RATE: 123
WBC # BLD AUTO: 11.06 K/UL (ref 3.8–10.5)

## 2021-11-28 PROCEDURE — 85610 PROTHROMBIN TIME: CPT

## 2021-11-28 PROCEDURE — 85025 COMPLETE CBC W/AUTO DIFF WBC: CPT

## 2021-11-28 PROCEDURE — 63700000 HC SELF-ADMINISTRABLE DRUG: Performed by: EMERGENCY MEDICINE

## 2021-11-28 PROCEDURE — 84484 ASSAY OF TROPONIN QUANT: CPT | Mod: 91 | Performed by: EMERGENCY MEDICINE

## 2021-11-28 PROCEDURE — 84443 ASSAY THYROID STIM HORMONE: CPT | Performed by: EMERGENCY MEDICINE

## 2021-11-28 PROCEDURE — 85610 PROTHROMBIN TIME: CPT | Performed by: EMERGENCY MEDICINE

## 2021-11-28 PROCEDURE — 84484 ASSAY OF TROPONIN QUANT: CPT | Performed by: EMERGENCY MEDICINE

## 2021-11-28 PROCEDURE — 85379 FIBRIN DEGRADATION QUANT: CPT | Performed by: EMERGENCY MEDICINE

## 2021-11-28 PROCEDURE — 36415 COLL VENOUS BLD VENIPUNCTURE: CPT

## 2021-11-28 PROCEDURE — 71046 X-RAY EXAM CHEST 2 VIEWS: CPT

## 2021-11-28 PROCEDURE — 85730 THROMBOPLASTIN TIME PARTIAL: CPT | Performed by: EMERGENCY MEDICINE

## 2021-11-28 PROCEDURE — 85730 THROMBOPLASTIN TIME PARTIAL: CPT

## 2021-11-28 PROCEDURE — 84484 ASSAY OF TROPONIN QUANT: CPT

## 2021-11-28 PROCEDURE — 80053 COMPREHEN METABOLIC PANEL: CPT | Performed by: EMERGENCY MEDICINE

## 2021-11-28 PROCEDURE — 80053 COMPREHEN METABOLIC PANEL: CPT

## 2021-11-28 PROCEDURE — 85025 COMPLETE CBC W/AUTO DIFF WBC: CPT | Performed by: EMERGENCY MEDICINE

## 2021-11-28 RX ORDER — METOPROLOL TARTRATE 50 MG/1
50 TABLET ORAL ONCE
Status: COMPLETED | OUTPATIENT
Start: 2021-11-28 | End: 2021-11-28

## 2021-11-28 RX ORDER — METOPROLOL TARTRATE 25 MG/1
25 TABLET, FILM COATED ORAL 2 TIMES DAILY
Qty: 60 TABLET | Refills: 0 | Status: SHIPPED | OUTPATIENT
Start: 2021-11-28 | End: 2021-12-09 | Stop reason: SDUPTHER

## 2021-11-28 RX ADMIN — METOPROLOL TARTRATE 50 MG: 50 TABLET, FILM COATED ORAL at 03:21

## 2021-11-28 ASSESSMENT — ENCOUNTER SYMPTOMS
CHEST PRESSURE: 1
PALPITATIONS: 1
SHORTNESS OF BREATH: 1
NUMBNESS: 0
RHINORRHEA: 0
CHEST TIGHTNESS: 0
CHILLS: 0
FREQUENCY: 0
ORTHOPNEA: 0
LEG PAIN: 0
SYNCOPE: 0
DIAPHORESIS: 0
BRUISES/BLEEDS EASILY: 0
DYSURIA: 0
NEAR-SYNCOPE: 1
SORE THROAT: 0
LOWER EXTREMITY EDEMA: 0
DIZZINESS: 0
TROUBLE SWALLOWING: 0
WEAKNESS: 0
COLOR CHANGE: 0
BACK PAIN: 0
VOMITING: 0
PND: 0
NAUSEA: 1
HEMOPTYSIS: 0
ABDOMINAL PAIN: 0
FEVER: 0
DIARRHEA: 0
HEMATURIA: 0
COUGH: 0
EYE REDNESS: 0

## 2021-11-28 NOTE — ED PROVIDER NOTES
Emergency Medicine Note  HPI   HISTORY OF PRESENT ILLNESS       History provided by:  Patient   used: No    Palpitations  Palpitations quality:  Fast  Onset quality:  Sudden  Duration:  3 minutes  Timing:  Intermittent  Progression:  Waxing and waning  Chronicity:  New  Context: anxiety    Relieved by:  Nothing  Worsened by:  Stress  Ineffective treatments:  None tried  Associated symptoms: chest pressure, nausea, near-syncope and shortness of breath    Associated symptoms: no back pain, no chest pain, no cough, no diaphoresis, no dizziness, no hemoptysis, no leg pain, no lower extremity edema, no numbness, no orthopnea, no PND, no syncope, no vomiting and no weakness    Risk factors: stress    Risk factors: no diabetes mellitus, no heart disease, no hx of atrial fibrillation, no hx of DVT, no hx of PE, no hx of thyroid disease, no hypercoagulable state, no hyperthyroidism and no OTC sinus medications          Patient History   PAST HISTORY     Reviewed from Nursing Triage:      Past Medical History:   Diagnosis Date   • Back pain    • BPH (benign prostatic hyperplasia)    • Lipid disorder        Past Surgical History:   Procedure Laterality Date   • LIVER BIOPSY  1984   • VASECTOMY     • WISDOM TOOTH EXTRACTION         Family History   Problem Relation Age of Onset   • Diabetes Biological Mother    • Other Biological Mother         fatty liver   • Breast cancer Biological Mother         twice   • Multiple myeloma Biological Father    • Hypertension Biological Father         mild   • Obesity Biological Sister    • Thyroid cancer Biological Brother    • No Known Problems Biological Brother    • No Known Problems Biological Sister    • No Known Problems Biological Son    • No Known Problems Biological Son    • No Known Problems Biological Daughter        Social History     Tobacco Use   • Smoking status: Never Smoker   • Smokeless tobacco: Never Used   Vaping Use   • Vaping Use: Never used    Substance Use Topics   • Alcohol use: Yes     Comment: only on perez malcolm   • Drug use: Never         Review of Systems   REVIEW OF SYSTEMS     Review of Systems   Constitutional: Negative for chills, diaphoresis and fever.   HENT: Negative for ear pain, rhinorrhea, sore throat and trouble swallowing.    Eyes: Negative for redness and visual disturbance.   Respiratory: Positive for shortness of breath. Negative for cough, hemoptysis and chest tightness.    Cardiovascular: Positive for palpitations and near-syncope. Negative for chest pain, orthopnea, syncope and PND.   Gastrointestinal: Positive for nausea. Negative for abdominal pain, diarrhea and vomiting.   Genitourinary: Negative for dysuria, frequency, hematuria and urgency.   Musculoskeletal: Negative for back pain and gait problem.   Skin: Negative for color change and rash.   Neurological: Negative for dizziness, syncope, weakness and numbness.   Hematological: Does not bruise/bleed easily.         VITALS     ED Vitals    Date/Time Temp Pulse Resp BP SpO2 [facility]   11/28/21 0549 -- -- 16 131/91 94 % [facility]   11/28/21 0408 36.6 °C (97.9 °F) 74 18 130/87 96 % [facility]   11/28/21 0321 -- 96 -- 146/96 -- [facility]   11/28/21 0305 -- -- 12 170/109 98 % [facility]   11/27/21 2342 37.3 °C (99.1 °F) 127 20 188/90 99 % [facility]        Pulse Ox %: 99 % (11/28/21 0354)  Pulse Ox Interpretation: Normal (11/28/21 0354)  Heart Rate: 107 (11/28/21 0354)  Rhythm Strip Interpretation: Sinus Tachycardia (11/28/21 0354)     Physical Exam   PHYSICAL EXAM     Physical Exam  Vitals and nursing note reviewed.   Constitutional:       General: He is not in acute distress.     Appearance: He is well-developed.   HENT:      Head: Normocephalic and atraumatic.   Eyes:      Conjunctiva/sclera: Conjunctivae normal.   Cardiovascular:      Rate and Rhythm: Regular rhythm. Tachycardia present.      Heart sounds: Normal heart sounds. No murmur heard.      Pulmonary:      Effort: Pulmonary effort is normal. No  respiratory distress.      Breath sounds: Normal breath sounds.   Abdominal:      Palpations: Abdomen is soft.      Tenderness: There is no abdominal tenderness. There is no guarding or rebound.   Musculoskeletal:         General: Normal range of motion.      Cervical back: Neck supple.   Skin:     General: Skin is warm and dry.      Findings: No rash.   Neurological:      Mental Status: He is alert.      GCS: GCS eye subscore is 4. GCS verbal subscore is 5. GCS motor subscore is 6.           PROCEDURES     Procedures     DATA     Results     Procedure Component Value Units Date/Time    Troponin I [[id number]]  (Normal) Collected: 11/28/21 0408    Specimen: Blood, Venous Updated: 11/28/21 0441     Troponin I <0.02 ng/mL     D-dimer, quantitative [[id number]]  (Normal) Collected: 11/28/21 0005    Specimen: Blood, Venous Updated: 11/28/21 0431     D-Dimer, Quant 0.50 ug/mL FEU      Comment: Suggested Age Related Reference Range: 0.00 - 0.57  The D-Dimer assay can be used as an aid in the diagnosis of DVT or PE. The test can not be used by itself to exclude DVT or PE. When used as a diagnostic aid, the cutoff value is the same as the reference range: <0.5 ug/ml FEU.       TSH w reflex FT4 [[id number]]  (Normal) Collected: 11/28/21 0005    Specimen: Blood, Venous Updated: 11/28/21 0403     TSH 3.75 mIU/L     Troponin I [[id number]]  (Normal) Collected: 11/28/21 0005    Specimen: Blood, Venous Updated: 11/28/21 0043     Troponin I <0.02 ng/mL     Comprehensive metabolic panel [[id number]]  (Abnormal) Collected: 11/28/21 0005    Specimen: Blood, Venous Updated: 11/28/21 0034     Sodium 135 mEQ/L      Potassium 3.8 mEQ/L      Comment: Results obtained on plasma. Plasma Potassium values may be up to 0.4 mEQ/L less than serum values. The differences may be greater for patients with high platelet or white cell counts.        Chloride 102 mEQ/L      CO2 24 mEQ/L      BUN 23 mg/dL      Creatinine 1.3 mg/dL      Glucose 120 mg/dL       Calcium 9.2 mg/dL      AST (SGOT) 26 IU/L      ALT (SGPT) 31 IU/L      Alkaline Phosphatase 74 IU/L      Total Protein 7.5 g/dL      Comment: Test performed on plasma which typically contains approximately 0.4 g/dL more protein than serum.        Albumin 3.9 g/dL      Bilirubin, Total 0.5 mg/dL      eGFR 56.9 mL/min/1.73m*2      Anion Gap 9 mEQ/L     Protime-INR [[id number]]  (Normal) Collected: 11/28/21 0005    Specimen: Blood, Venous Updated: 11/28/21 0033     PT 14.1 sec      INR 1.1     Comment: INR has no defined significance when PT is within Reference Range.       APTT [[id number]]  (Normal) Collected: 11/28/21 0005    Specimen: Blood, Venous Updated: 11/28/21 0033     PTT 29 sec     CBC and differential [[id number]]  (Abnormal) Collected: 11/28/21 0005    Specimen: Blood, Venous Updated: 11/28/21 0020     WBC 11.06 K/uL      RBC 5.75 M/uL      Hemoglobin 16.6 g/dL      Hematocrit 52.7 %      MCV 91.7 fL      MCH 28.9 pg      MCHC 31.5 g/dL      RDW 14.1 %      Platelets 218 K/uL      MPV 9.5 fL      Differential Type Auto     nRBC 0.0 %      Immature Granulocytes 0.5 %      Neutrophils 67.1 %      Lymphocytes 20.1 %      Monocytes 11.1 %      Eosinophils 0.6 %      Basophils 0.6 %      Immature Granulocytes, Absolute 0.06 K/uL      Neutrophils, Absolute 7.41 K/uL      Lymphocytes, Absolute 2.22 K/uL      Monocytes, Absolute 1.23 K/uL      Eosinophils, Absolute 0.07 K/uL      Basophils, Absolute 0.07 K/uL           Imaging Results          X-RAY CHEST 2 VIEWS (Preliminary result)  Result time 11/28/21 04:02:16    ED Interpretation    NAD                              ECG 12 lead   ED Interpretation   Sinus tach rate 123, PACs, NAD          Scoring tools                                 ED Course & MDM   MDM / ED COURSE and CLINICAL IMPRESSIONS     MDM  Number of Diagnoses or Management Options  Sinus tachycardia  Diagnosis management comments: Patient is a 56-year-old male with history of obesity and hypertension  who presents complaining of palpitations.  Patient has intermittent sensation of palpitations lightheadedness and general unease.  These symptoms last approximately 2 to 5 minutes.  Resolved spontaneously.  And has having 7-12 episodes a day.  These episodes started 3 days ago and have been persistent.  Presented to the ER for eval.    On work-up including EKG showed a sinus tachycardia with no other abnormalities.  2 sets of cardiac enzymes were normal thyroid and D-dimer studies were normal.  Patient was medicated with and oral beta-blocker.  This reduced his sinus tachycardia to a normal sinus rhythm in the 70s.  And his symptoms improved.  There is no evidence for any acute cardiac abnormality at this time.  Unexplained sinus tachycardia.  Possibly related to recent overeating for the holiday.  Will discharge home with prescription for metoprolol.  Recommend patient follow-up with cardiology as an outpatient.       Amount and/or Complexity of Data Reviewed  Clinical lab tests: reviewed  Independent visualization of images, tracings, or specimens: yes        Clinical Impressions as of 11/28/21 0637   Sinus tachycardia            VIDHYA Grady MD  11/28/21 0637

## 2021-11-29 ENCOUNTER — TELEPHONE (OUTPATIENT)
Dept: PRIMARY CARE | Facility: CLINIC | Age: 56
End: 2021-11-29

## 2021-11-29 NOTE — TELEPHONE ENCOUNTER
Spoke w/ pt. For past 2 wks pt has been feeling dizzy. Starting having heart flutter/ palpitation feeling. Was having tingles down his arms. Went to ED over the weekend. Was Bp was elevated. Was put on metoprolol. Pt is still having symptoms. Pt also had elevated WBC. F/u made on 11/30 at 9:45am.

## 2021-11-29 NOTE — TELEPHONE ENCOUNTER
"Pt wanted an appt w/ Dr. Coats today. He went to [facility] this weekend for \"heart fluttering and dizziness\". He wants to F/U w/ Dr. BHATT and discuss a cardiologist recommendation. He also is asking that a repeat WBC be ordered. Nothing available that I could see, can someone please reach out? Pt says that if he doesn't answer on the home number, please don't leave a voicemail, just try the cell  "

## 2021-11-30 ENCOUNTER — OFFICE VISIT (OUTPATIENT)
Dept: PRIMARY CARE | Facility: CLINIC | Age: 56
End: 2021-11-30
Payer: COMMERCIAL

## 2021-11-30 VITALS
BODY MASS INDEX: 37.5 KG/M2 | DIASTOLIC BLOOD PRESSURE: 80 MMHG | WEIGHT: 253.2 LBS | SYSTOLIC BLOOD PRESSURE: 124 MMHG | OXYGEN SATURATION: 98 % | HEART RATE: 75 BPM | HEIGHT: 69 IN | TEMPERATURE: 96.4 F | RESPIRATION RATE: 16 BRPM

## 2021-11-30 DIAGNOSIS — Z12.11 COLON CANCER SCREENING: ICD-10-CM

## 2021-11-30 DIAGNOSIS — R00.0 SINUS TACHYCARDIA: ICD-10-CM

## 2021-11-30 DIAGNOSIS — R00.2 PALPITATIONS: Primary | ICD-10-CM

## 2021-11-30 PROCEDURE — 3008F BODY MASS INDEX DOCD: CPT | Performed by: FAMILY MEDICINE

## 2021-11-30 PROCEDURE — 99213 OFFICE O/P EST LOW 20 MIN: CPT | Performed by: FAMILY MEDICINE

## 2021-12-07 NOTE — PROGRESS NOTES
Cardiology  Office Consult Note         Reason for visit: No chief complaint on file.      HPI     Cesar Cruz is a 56 y.o. male who presents to the office for cardiovascular evaluation. He has a PMH of obesity and HTN.     He was in [facility] ER on 11/28/21 for palpitations with associated lightheadedness. It lasts 2 to 5 minutes but episodes occur 7-12 times daily. EKG showed sinus tachycardia with PACs.  ACS ruled out. He was treated with PO beta-blocker which slowed his HR down to the 70s with improvement in his symptoms.  He was discharged home with metoprolol. Recommended he follow-up with cardiology.        {REVIEWED RECORDS:29228}.         Past Medical History:   Diagnosis Date   • Back pain    • BPH (benign prostatic hyperplasia)    • Lipid disorder    • Sinus tachycardia 11/28/2021    Evaluated in ED on 11/28/21 for palpitations, unexplained tachycardia.       Past Surgical History:   Procedure Laterality Date   • LIVER BIOPSY  1984   • VASECTOMY     • WISDOM TOOTH EXTRACTION          Social History     Tobacco Use   Smoking Status Never Smoker   Smokeless Tobacco Never Used     Social History     Tobacco Use   • Smoking status: Never Smoker   • Smokeless tobacco: Never Used   Vaping Use   • Vaping Use: Never used   Substance Use Topics   • Alcohol use: Yes     Comment: only on perez gold   • Drug use: Never       Family History   Problem Relation Age of Onset   • Diabetes Biological Mother    • Other Biological Mother         fatty liver   • Breast cancer Biological Mother         twice   • Multiple myeloma Biological Father    • Hypertension Biological Father         mild   • Obesity Biological Sister    • Thyroid cancer Biological Brother    • No Known Problems Biological Brother    • No Known Problems Biological Sister    • No Known Problems Biological Son    • No Known Problems Biological Son    • No Known Problems Biological Daughter        Allergies:  Ciprofloxacin    Current  Outpatient Medications   Medication Sig Dispense Refill   • aspirin 81 mg enteric coated tablet 81 mg.     • famotidine (PEPCID) 20 mg tablet Take 1 tablet (20 mg total) by mouth daily. 90 tablet 3   • finasteride (PROSCAR) 5 mg tablet Take 5 mg by mouth daily.     • lidocaine (LIDODERM) 5 % patch Apply 1 patch topically daily. Remove & discard patch within 12 hours or as directed by presscriber. 20 patch 0   • metoprolol tartrate 25 mg tablet Take 1 tablet (25 mg total) by mouth 2 (two) times a day. 60 tablet 0   • simvastatin (ZOCOR) 10 mg tablet Take 1 tablet (10 mg total) by mouth nightly. 90 tablet 3   • tamsulosin (FLOMAX) 0.4 mg capsule Take 0.4 mg by mouth.       No current facility-administered medications for this visit.       ROS    Objective     There were no vitals filed for this visit.    Wt Readings from Last 1 Encounters:   11/30/21 115 kg (253 lb 3.2 oz)       There is no height or weight on file to calculate BMI.    Physical Exam    ***  ECG   {Findings; ecg normal:49157}    Hematology  Lab Results   Component Value Date    WBC 11.06 (H) 11/28/2021    HGB 16.6 11/28/2021    HCT 52.7 (H) 11/28/2021     11/28/2021    INR 1.1 11/28/2021       Chemistries  Lab Results   Component Value Date     (L) 11/28/2021    K 3.8 11/28/2021     11/28/2021    CREATININE 1.3 11/28/2021    BUN 23 (H) 11/28/2021    CO2 24 11/28/2021    GLUCOSE 120 (H) 11/28/2021    CALCIUM 9.2 11/28/2021    ALT 31 11/28/2021    AST 26 11/28/2021       Cholesterol  Lab Results   Component Value Date    CHOL 173 09/14/2021    TRIG 115 09/14/2021    HDL 44 (L) 09/14/2021    LDLCALC 106 (H) 09/14/2021    NONHDLCALC 129 09/14/2021       Endocrine  Lab Results   Component Value Date    TSH 3.75 11/28/2021    HGBA1C 5.8 (H) 09/14/2021            Assessment/Plan     No problem-specific Assessment & Plan notes found for this encounter.    No orders of the defined types were placed in this encounter.      There are no  discontinued medications.    No orders of the defined types were placed in this encounter.           I, Arelis Parrish, am scribing for, and in the presence of, Hebert Donahue Jr., MD.     I, Hebert Donahue Jr., MD, personally performed the services described in this documentation as scribed by Arelis Parrish in my presence, and it is both accurate and complete.         Hebert Donahue Jr., MD  12/7/2021

## 2021-12-09 ENCOUNTER — OFFICE VISIT (OUTPATIENT)
Dept: CARDIOLOGY | Facility: CLINIC | Age: 56
End: 2021-12-09
Payer: COMMERCIAL

## 2021-12-09 VITALS
HEART RATE: 84 BPM | WEIGHT: 250.8 LBS | HEIGHT: 69 IN | SYSTOLIC BLOOD PRESSURE: 128 MMHG | OXYGEN SATURATION: 97 % | BODY MASS INDEX: 37.15 KG/M2 | DIASTOLIC BLOOD PRESSURE: 80 MMHG

## 2021-12-09 DIAGNOSIS — R03.0 ELEVATED BLOOD PRESSURE READING: ICD-10-CM

## 2021-12-09 DIAGNOSIS — E78.5 HYPERLIPIDEMIA, UNSPECIFIED HYPERLIPIDEMIA TYPE: ICD-10-CM

## 2021-12-09 DIAGNOSIS — R00.2 PALPITATIONS: Primary | ICD-10-CM

## 2021-12-09 PROCEDURE — 3008F BODY MASS INDEX DOCD: CPT | Performed by: INTERNAL MEDICINE

## 2021-12-09 PROCEDURE — 93000 ELECTROCARDIOGRAM COMPLETE: CPT | Performed by: INTERNAL MEDICINE

## 2021-12-09 PROCEDURE — 99204 OFFICE O/P NEW MOD 45 MIN: CPT | Performed by: INTERNAL MEDICINE

## 2021-12-09 RX ORDER — METOPROLOL TARTRATE 25 MG/1
25 TABLET, FILM COATED ORAL 2 TIMES DAILY
Qty: 60 TABLET | Refills: 6 | Status: SHIPPED | OUTPATIENT
Start: 2021-12-09 | End: 2022-01-12

## 2021-12-09 ASSESSMENT — ENCOUNTER SYMPTOMS
DIZZINESS: 0
LIGHT-HEADEDNESS: 1
SHORTNESS OF BREATH: 0
PALPITATIONS: 1
IRREGULAR HEARTBEAT: 0
DYSPNEA ON EXERTION: 0
NEAR-SYNCOPE: 0

## 2021-12-09 NOTE — ASSESSMENT & PLAN NOTE
He reports some elevated blood pressure readings at home.  I have asked him to continue to monitor this and keep a log to review at our next visit.

## 2021-12-09 NOTE — ASSESSMENT & PLAN NOTE
His EKG today is normal.  His physical exam is normal.  His electrolytes and TSH are normal.  We will evaluate this further with an outpatient monitor and a resting echocardiogram.  We will continue his metoprolol for now.

## 2021-12-09 NOTE — LETTER
December 9, 2021     Emil Coats MD  [address]  [place] [address]    Patient: Cesar Cruz  YOB: 1965  Date of Visit: 12/9/2021      Dear Dr. Coats:    Thank you for referring Cesar Cruz to me for evaluation. Below are my notes for this consultation.    If you have questions, please do not hesitate to call me. I look forward to following your patient along with you.         Sincerely,        Hebert Donahue Jr., MD        CC: No Recipients  Hebert Donahue Jr., MD  12/9/2021  8:30 AM  Signed       Cardiology  Office Consult Note         Reason for visit:   Chief Complaint   Patient presents with   • New Patient       HPI     Cesar Cruz is a 56 y.o. male who presents to the office for cardiovascular evaluation. He has a PMH of obesity, GERD, and HTN.      He was in [facility] ER on 11/28/21 for palpitations with associated lightheadedness. It lasts 2 to 5 minutes but episodes occur 7-12 times daily. EKG showed sinus tachycardia with PACs.  ACS ruled out. He was treated with PO beta-blocker which slowed his HR down to the 70s with improvement in his symptoms.  He was discharged home with metoprolol. Recommended he follow-up with cardiology.     He reports that this has all started after decreasing his finasteride from 10mg to 5mg for his prostated due to fatigue and decreased sex drive. He has since been having headaches that happen suddenly. He has bought a BP cough at home and will take his BP at home when he has these symptoms and it will be elevated. He reports that the medication is the only change that he has recently made and wonders if the decrease in finasteride has caused symptoms.     He also reports that he has been seen in our practice 10-15 yrs ago for chest pain. Did a treadmill stress test that was negative at the time and later found to be GERD.     Pertinent lab results reviewed..    Past Medical History:   Diagnosis Date   •  Back pain    • BPH (benign prostatic hyperplasia)    • Lipid disorder    • Sinus tachycardia 11/28/2021    Evaluated in ED on 11/28/21 for palpitations, unexplained tachycardia.       Past Surgical History:   Procedure Laterality Date   • EPIDURAL BLOCK INJECTION      lumbar   • LIVER BIOPSY  1984   • VASECTOMY     • WISDOM TOOTH EXTRACTION          Social History     Tobacco Use   Smoking Status Never Smoker   Smokeless Tobacco Never Used     Social History     Tobacco Use   • Smoking status: Never Smoker   • Smokeless tobacco: Never Used   Vaping Use   • Vaping Use: Never used   Substance Use Topics   • Alcohol use: Not Currently     Comment: only on perez gold   • Drug use: Never       Family History   Problem Relation Age of Onset   • Diabetes Biological Mother    • Other Biological Mother         fatty liver   • Breast cancer Biological Mother         twice   • Multiple myeloma Biological Father    • Hypertension Biological Father         mild   • Obesity Biological Sister    • Thyroid cancer Biological Brother    • No Known Problems Biological Brother    • No Known Problems Biological Sister    • No Known Problems Biological Son    • No Known Problems Biological Son    • No Known Problems Biological Daughter        Allergies:  Ciprofloxacin    Current Outpatient Medications   Medication Sig Dispense Refill   • aspirin 81 mg enteric coated tablet 81 mg.     • famotidine (PEPCID) 20 mg tablet Take 1 tablet (20 mg total) by mouth daily. 90 tablet 3   • finasteride (PROSCAR) 5 mg tablet Take 5 mg by mouth daily. Taking every other day      • lidocaine (LIDODERM) 5 % patch Apply 1 patch topically daily. Remove & discard patch within 12 hours or as directed by presscriber. 20 patch 0   • metoprolol tartrate 25 mg tablet Take 1 tablet (25 mg total) by mouth 2 (two) times a day. 60 tablet 6   • simvastatin (ZOCOR) 10 mg tablet Take 1 tablet (10 mg total) by mouth nightly. 90 tablet 3   • tamsulosin (FLOMAX) 0.4 mg  capsule Take 0.4 mg by mouth.       No current facility-administered medications for this visit.       Review of Systems   Cardiovascular: Positive for palpitations. Negative for chest pain, dyspnea on exertion, irregular heartbeat, leg swelling and near-syncope.   Respiratory: Negative for shortness of breath.    Neurological: Positive for light-headedness. Negative for dizziness.       Objective     Vitals:    12/09/21 0741   BP: 128/80   Pulse: 84   SpO2: 97%       Wt Readings from Last 1 Encounters:   12/09/21 114 kg (250 lb 12.8 oz)       Body mass index is 37.04 kg/m².    Physical Exam  Vitals reviewed.   Cardiovascular:      Rate and Rhythm: Normal rate and regular rhythm.      Pulses: Normal pulses.           Dorsalis pedis pulses are 2+ on the right side and 2+ on the left side.      Heart sounds: Normal heart sounds.   Pulmonary:      Effort: Pulmonary effort is normal.   Musculoskeletal:         General: Normal range of motion.      Cervical back: Normal range of motion.   Skin:     General: Skin is warm.      Capillary Refill: Capillary refill takes less than 2 seconds.   Neurological:      Mental Status: He is alert and oriented to person, place, and time.           ECG   sinus rhythm    Hematology  Lab Results   Component Value Date    WBC 11.06 (H) 11/28/2021    HGB 16.6 11/28/2021    HCT 52.7 (H) 11/28/2021     11/28/2021    INR 1.1 11/28/2021       Chemistries  Lab Results   Component Value Date     (L) 11/28/2021    K 3.8 11/28/2021     11/28/2021    CREATININE 1.3 11/28/2021    BUN 23 (H) 11/28/2021    CO2 24 11/28/2021    GLUCOSE 120 (H) 11/28/2021    CALCIUM 9.2 11/28/2021    ALT 31 11/28/2021    AST 26 11/28/2021       Cholesterol  Lab Results   Component Value Date    CHOL 173 09/14/2021    TRIG 115 09/14/2021    HDL 44 (L) 09/14/2021    LDLCALC 106 (H) 09/14/2021    NONHDLCALC 129 09/14/2021       Endocrine  Lab Results   Component Value Date    TSH 3.75 11/28/2021     HGBA1C 5.8 (H) 09/14/2021       Assessment/Plan     Palpitations  His EKG today is normal.  His physical exam is normal.  His electrolytes and TSH are normal.  We will evaluate this further with an outpatient monitor and a resting echocardiogram.  We will continue his metoprolol for now.    Hyperlipidemia  His lipids are reasonably well controlled and he is tolerating his current statin dose.    Elevated blood pressure reading  He reports some elevated blood pressure readings at home.  I have asked him to continue to monitor this and keep a log to review at our next visit.        Orders Placed This Encounter   Procedures   • Mobile cardiac outpatient telemetry     Standing Status:   Future     Standing Expiration Date:   12/9/2022     Order Specific Question:   Length of monitoring (days)     Answer:   7     Comments:   days     Order Specific Question:   Release to patient     Answer:   Immediate   • ECG 12 lead     Scheduling Instructions:      PLEASE USE THIS ORDER FOR ECG'S PERFORMED IN PHYSICIAN OFFICES     Order Specific Question:   Release to patient     Answer:   Immediate   • Transthoracic echo (TTE) complete     Standing Status:   Future     Standing Expiration Date:   12/9/2023     Order Specific Question:   Is Definity and/or agitated saline (bubbles) indicated for the study?     Answer:   No     Order Specific Question:   Release to patient     Answer:   Immediate            Paula FONTAINE, am scribing for, and in the presence of, Hebert Donahue Jr., MD.    Hebert FONTAINE Jr., MD, personally performed the services described in this documentation as scribed by Paula Price in my presence, and it is both accurate and complete.     Hebert Donahue Jr., MD  12/9/2021

## 2021-12-09 NOTE — PROGRESS NOTES
Cardiology  Office Consult Note         Reason for visit:   Chief Complaint   Patient presents with   • New Patient       HPI     Cesar Cruz is a 56 y.o. male who presents to the office for cardiovascular evaluation. He has a PMH of obesity, GERD, and HTN.      He was in [place]'s ER on 11/28/21 for palpitations with associated lightheadedness. It lasts 2 to 5 minutes but episodes occur 7-12 times daily. EKG showed sinus tachycardia with PACs.  ACS ruled out. He was treated with PO beta-blocker which slowed his HR down to the 70s with improvement in his symptoms.  He was discharged home with metoprolol. Recommended he follow-up with cardiology.     He reports that this has all started after decreasing his finasteride from 10mg to 5mg for his prostated due to fatigue and decreased sex drive. He has since been having headaches that happen suddenly. He has bought a BP cough at home and will take his BP at home when he has these symptoms and it will be elevated. He reports that the medication is the only change that he has recently made and wonders if the decrease in finasteride has caused symptoms.     He also reports that he has been seen in our practice 10-15 yrs ago for chest pain. Did a treadmill stress test that was negative at the time and later found to be GERD.     Pertinent lab results reviewed..    Past Medical History:   Diagnosis Date   • Back pain    • BPH (benign prostatic hyperplasia)    • Lipid disorder    • Sinus tachycardia 11/28/2021    Evaluated in ED on 11/28/21 for palpitations, unexplained tachycardia.       Past Surgical History:   Procedure Laterality Date   • EPIDURAL BLOCK INJECTION      lumbar   • LIVER BIOPSY  1984   • VASECTOMY     • WISDOM TOOTH EXTRACTION          Social History     Tobacco Use   Smoking Status Never Smoker   Smokeless Tobacco Never Used     Social History     Tobacco Use   • Smoking status: Never Smoker   • Smokeless tobacco: Never Used   Vaping Use   •  Vaping Use: Never used   Substance Use Topics   • Alcohol use: Not Currently     Comment: only on perez malcolm   • Drug use: Never       Family History   Problem Relation Age of Onset   • Diabetes Biological Mother    • Other Biological Mother         fatty liver   • Breast cancer Biological Mother         twice   • Multiple myeloma Biological Father    • Hypertension Biological Father         mild   • Obesity Biological Sister    • Thyroid cancer Biological Brother    • No Known Problems Biological Brother    • No Known Problems Biological Sister    • No Known Problems Biological Son    • No Known Problems Biological Son    • No Known Problems Biological Daughter        Allergies:  Ciprofloxacin    Current Outpatient Medications   Medication Sig Dispense Refill   • aspirin 81 mg enteric coated tablet 81 mg.     • famotidine (PEPCID) 20 mg tablet Take 1 tablet (20 mg total) by mouth daily. 90 tablet 3   • finasteride (PROSCAR) 5 mg tablet Take 5 mg by mouth daily. Taking every other day      • lidocaine (LIDODERM) 5 % patch Apply 1 patch topically daily. Remove & discard patch within 12 hours or as directed by presscriber. 20 patch 0   • metoprolol tartrate 25 mg tablet Take 1 tablet (25 mg total) by mouth 2 (two) times a day. 60 tablet 6   • simvastatin (ZOCOR) 10 mg tablet Take 1 tablet (10 mg total) by mouth nightly. 90 tablet 3   • tamsulosin (FLOMAX) 0.4 mg capsule Take 0.4 mg by mouth.       No current facility-administered medications for this visit.       Review of Systems   Cardiovascular: Positive for palpitations. Negative for chest pain, dyspnea on exertion, irregular heartbeat, leg swelling and near-syncope.   Respiratory: Negative for shortness of breath.    Neurological: Positive for light-headedness. Negative for dizziness.       Objective     Vitals:    12/09/21 0741   BP: 128/80   Pulse: 84   SpO2: 97%       Wt Readings from Last 1 Encounters:   12/09/21 114 kg (250 lb 12.8 oz)       Body mass  index is 37.04 kg/m².    Physical Exam  Vitals reviewed.   Cardiovascular:      Rate and Rhythm: Normal rate and regular rhythm.      Pulses: Normal pulses.           Dorsalis pedis pulses are 2+ on the right side and 2+ on the left side.      Heart sounds: Normal heart sounds.   Pulmonary:      Effort: Pulmonary effort is normal.   Musculoskeletal:         General: Normal range of motion.      Cervical back: Normal range of motion.   Skin:     General: Skin is warm.      Capillary Refill: Capillary refill takes less than 2 seconds.   Neurological:      Mental Status: He is alert and oriented to person, place, and time.           ECG   sinus rhythm    Hematology  Lab Results   Component Value Date    WBC 11.06 (H) 11/28/2021    HGB 16.6 11/28/2021    HCT 52.7 (H) 11/28/2021     11/28/2021    INR 1.1 11/28/2021       Chemistries  Lab Results   Component Value Date     (L) 11/28/2021    K 3.8 11/28/2021     11/28/2021    CREATININE 1.3 11/28/2021    BUN 23 (H) 11/28/2021    CO2 24 11/28/2021    GLUCOSE 120 (H) 11/28/2021    CALCIUM 9.2 11/28/2021    ALT 31 11/28/2021    AST 26 11/28/2021       Cholesterol  Lab Results   Component Value Date    CHOL 173 09/14/2021    TRIG 115 09/14/2021    HDL 44 (L) 09/14/2021    LDLCALC 106 (H) 09/14/2021    NONHDLCALC 129 09/14/2021       Endocrine  Lab Results   Component Value Date    TSH 3.75 11/28/2021    HGBA1C 5.8 (H) 09/14/2021       Assessment/Plan     Palpitations  His EKG today is normal.  His physical exam is normal.  His electrolytes and TSH are normal.  We will evaluate this further with an outpatient monitor and a resting echocardiogram.  We will continue his metoprolol for now.    Hyperlipidemia  His lipids are reasonably well controlled and he is tolerating his current statin dose.    Elevated blood pressure reading  He reports some elevated blood pressure readings at home.  I have asked him to continue to monitor this and keep a log to review at  our next visit.        Orders Placed This Encounter   Procedures   • Mobile cardiac outpatient telemetry     Standing Status:   Future     Standing Expiration Date:   12/9/2022     Order Specific Question:   Length of monitoring (days)     Answer:   7     Comments:   days     Order Specific Question:   Release to patient     Answer:   Immediate   • ECG 12 lead     Scheduling Instructions:      PLEASE USE THIS ORDER FOR ECG'S PERFORMED IN PHYSICIAN OFFICES     Order Specific Question:   Release to patient     Answer:   Immediate   • Transthoracic echo (TTE) complete     Standing Status:   Future     Standing Expiration Date:   12/9/2023     Order Specific Question:   Is Definity and/or agitated saline (bubbles) indicated for the study?     Answer:   No     Order Specific Question:   Release to patient     Answer:   Immediate            Paula FONTAINE, am scribing for, and in the presence of, Hebert Donahue Jr., MD.    Hebert FONTAINE Jr., MD, personally performed the services described in this documentation as scribed by Paula Price in my presence, and it is both accurate and complete.     Hebert Donahue Jr., MD  12/9/2021

## 2021-12-13 ENCOUNTER — HOSPITAL ENCOUNTER (OUTPATIENT)
Dept: CARDIOLOGY | Facility: CLINIC | Age: 56
Discharge: HOME | End: 2021-12-13
Attending: INTERNAL MEDICINE
Payer: COMMERCIAL

## 2021-12-13 VITALS
DIASTOLIC BLOOD PRESSURE: 80 MMHG | HEIGHT: 69 IN | WEIGHT: 250 LBS | SYSTOLIC BLOOD PRESSURE: 130 MMHG | BODY MASS INDEX: 37.03 KG/M2

## 2021-12-13 DIAGNOSIS — R00.2 PALPITATIONS: ICD-10-CM

## 2021-12-13 PROCEDURE — 93306 TTE W/DOPPLER COMPLETE: CPT | Performed by: INTERNAL MEDICINE

## 2021-12-14 LAB
AORTIC ROOT ANNULUS: 2.9 CM
AORTIC VALVE MEAN VELOCITY: 0.92 M/S
AORTIC VALVE VELOCITY TIME INTEGRAL: 28.5 CM
ASCENDING AORTA: 3 CM
AV MEAN GRADIENT: 4 MMHG
AV PEAK GRADIENT: 7 MMHG
AV PEAK VELOCITY-S: 1.31 M/S
AV VALVE AREA: 2.75 CM2
BSA FOR ECHO PROCEDURE: 2.35 M2
CUSP SEPARATION: 2 CM
DOP CALC LVOT STROKE VOLUME: 77.55 ML
E WAVE DECELERATION TIME: 173 MS
E/A RATIO: 1.6
E/E' RATIO: 13.3
E/LAT E' RATIO: 8.2
EDV (BP): 47.9 CM3
EF (A4C): 60.6 %
EF A2C: 63.2 %
EJECTION FRACTION: 63 %
ESV (BP): 17.7 CM3
FRACTIONAL SHORTENING: 33.4 %
INTERVENTRICULAR SEPTUM: 1.35 CM
LA ESV (BP): 62.1 CM3
LA ESV INDEX (A2C): 25.4 CM3/M2
LA ESV INDEX (BP): 26.43 CM3/M2
LA/AORTA RATIO: 1.38
LAAS-AP2: 20.4 CM2
LAAS-AP4: 21.4 CM2
LAD 2D: 4 CM
LALD A4C: 5.62 CM
LALD A4C: 6.38 CM
LAV-S: 59.7 CM3
LEFT ATRIUM VOLUME INDEX: 24.64 CM3/M2
LEFT ATRIUM VOLUME: 57.9 CM3
LEFT INTERNAL DIMENSION IN SYSTOLE: 2.53 CM (ref 4.15–6.28)
LEFT VENTRICLE DIASTOLIC VOLUME INDEX: 19.32 CM3/M2
LEFT VENTRICLE DIASTOLIC VOLUME: 45.4 CM3
LEFT VENTRICLE SYSTOLIC VOLUME INDEX: 7.62 CM3/M2
LEFT VENTRICLE SYSTOLIC VOLUME: 17.9 CM3
LEFT VENTRICULAR INTERNAL DIMENSION IN DIASTOLE: 3.8 CM (ref 7.16–9.95)
LEFT VENTRICULAR POSTERIOR WALL IN END DIASTOLE: 1.15 CM (ref 0.85–1.59)
LV DIASTOLIC VOLUME: 46.5 CM3
LV ESV (APICAL 2 CHAMBER): 17.1 CM3
LVAD-AP2: 21.1 CM2
LVAD-AP4: 22.1 CM2
LVAS-AP2: 11.7 CM2
LVAS-AP4: 12.1 CM2
LVEDVI(A2C): 19.79 CM3/M2
LVEDVI(BP): 20.38 CM3/M2
LVESVI(A2C): 7.28 CM3/M2
LVESVI(BP): 7.53 CM3/M2
LVLD-AP2: 8.02 CM
LVLD-AP4: 8.79 CM
LVLS-AP2: 6.72 CM
LVLS-AP4: 6.87 CM
LVOT 2D: 2.1 CM
LVOT A: 3.46 CM2
LVOT MG: 2 MMHG
LVOT MV: 0.73 M/S
LVOT PEAK VELOCITY: 1.04 M/S
LVOT VTI: 22.4 CM
MV E'TISSUE VEL-LAT: 0.14 M/S
MV E'TISSUE VEL-MED: 0.09 M/S
MV PEAK A VEL: 0.7 M/S
MV PEAK E VEL: 1.14 M/S
POSTERIOR WALL: 1.15 CM
PV PEAK GRADIENT: 6 MMHG
PV PV: 1.26 M/S
RVOT VMAX: 0.92 M/S
RVOT VTI: 19.5 CM
SEPTAL TISSUE DOPPLER FREE WALL LATE DIA VELOCITY (APICAL 4 CHAMBER VIEW): 0.13 M/S
Z-SCORE OF LEFT VENTRICULAR DIMENSION IN END DIASTOLE: -7.98
Z-SCORE OF LEFT VENTRICULAR DIMENSION IN END SYSTOLE: -5.55
Z-SCORE OF LEFT VENTRICULAR POSTERIOR WALL IN END DIASTOLE: -0.06

## 2021-12-16 ENCOUNTER — TELEPHONE (OUTPATIENT)
Dept: CARDIOLOGY | Facility: CLINIC | Age: 56
End: 2021-12-16
Payer: COMMERCIAL

## 2021-12-16 NOTE — TELEPHONE ENCOUNTER
Allyson, please call the patient regarding these results.  His echo looks fine.  No significant abnormalities.  Thanks.             Called and left message for patient to contact the office

## 2021-12-29 NOTE — PROGRESS NOTES
Cardiology  Office Progress Note         Reason for visit:   Chief Complaint   Patient presents with   • Follow-up       HPI     Cesar Cruz is a 56 y.o. male who presents to the office for cardiovascular follow up and management of HTN.      He was last seen in the office on 12/9/21 following up from  His ER visit for palpitations and lightheadedness. He as having more headaches. His home Bps were elevated. He was questioning if his symptoms were due to decreasing his finasteride dose. He was to have an echo and wear an MCOT x 7 days.     Echo 12/13/21 was normal.   MCOT: 23 runs of SVT, longest 15 beats. 2 reported symptoms correlated to sinus rhythm. He states he took a nasal spray and stated he felt his heart racing.     He states he has had minor feelings of palpitations that he relates to position. It has lessened since his last visit. He denies having any chest pain, shortness of breath, edema or lightheadedness.     He has been checking his BP at home and it almanza ranged 113-130s/70-80s., HR 60-90s. He remains active at home but does not exercise regularly. He does have back pain issues.        Past Medical History:   Diagnosis Date   • Back pain    • BPH (benign prostatic hyperplasia)    • Lipid disorder    • Sinus tachycardia 11/28/2021    Evaluated in ED on 11/28/21 for palpitations, unexplained tachycardia.       Past Surgical History:   Procedure Laterality Date   • EPIDURAL BLOCK INJECTION      lumbar   • LIVER BIOPSY  1984   • VASECTOMY     • WISDOM TOOTH EXTRACTION         Social History     Tobacco Use   • Smoking status: Never Smoker   • Smokeless tobacco: Never Used   Vaping Use   • Vaping Use: Never used   Substance Use Topics   • Alcohol use: Not Currently     Comment: only on perez malcolm   • Drug use: Never       Family History   Problem Relation Age of Onset   • Diabetes Biological Mother    • Other Biological Mother         fatty liver   • Breast cancer Biological Mother          twice   • Multiple myeloma Biological Father    • Hypertension Biological Father         mild   • Obesity Biological Sister    • Thyroid cancer Biological Brother    • No Known Problems Biological Brother    • No Known Problems Biological Sister    • No Known Problems Biological Son    • No Known Problems Biological Son    • No Known Problems Biological Daughter        Allergies:  Ciprofloxacin    Current Outpatient Medications   Medication Sig Dispense Refill   • aspirin 81 mg enteric coated tablet 81 mg.     • famotidine (PEPCID) 20 mg tablet Take 1 tablet (20 mg total) by mouth daily. 90 tablet 3   • finasteride (PROSCAR) 5 mg tablet Take 5 mg by mouth daily. Taking every other day      • lidocaine (LIDODERM) 5 % patch Apply 1 patch topically daily. Remove & discard patch within 12 hours or as directed by presscriber. 20 patch 0   • simvastatin (ZOCOR) 10 mg tablet Take 1 tablet (10 mg total) by mouth nightly. 90 tablet 3   • tamsulosin (FLOMAX) 0.4 mg capsule Take 0.4 mg by mouth.     • metoprolol succinate XL 50 mg 24 hr tablet Take 1 tablet (50 mg total) by mouth daily. 90 tablet 3     No current facility-administered medications for this visit.       Review of Systems   Cardiovascular: Positive for palpitations. Negative for chest pain, dyspnea on exertion and leg swelling.   Neurological: Negative for dizziness and light-headedness.       Objective     Vitals:    01/12/22 0807   BP: 124/80   Pulse: 81   SpO2: 98%       Wt Readings from Last 3 Encounters:   01/12/22 116 kg (255 lb 3.2 oz)   12/13/21 113 kg (250 lb)   12/09/21 114 kg (250 lb 12.8 oz)       Body mass index is 37.69 kg/m².    Physical Exam  Constitutional:       General: He is not in acute distress.     Appearance: Normal appearance.   HENT:      Head: Normocephalic.   Eyes:      Extraocular Movements: Extraocular movements intact.   Neck:      Vascular: No JVD.   Cardiovascular:      Rate and Rhythm: Normal rate and regular rhythm.      Heart  sounds: Normal heart sounds. No murmur heard.  Pulmonary:      Breath sounds: Normal breath sounds. No wheezing, rhonchi or rales.   Abdominal:      Palpations: Abdomen is soft.   Musculoskeletal:         General: No swelling.   Skin:     General: Skin is warm and dry.   Neurological:      Mental Status: He is alert.   Psychiatric:         Mood and Affect: Mood normal.          ECG   sinus rhythm    Hematology  Lab Results   Component Value Date    WBC 11.06 (H) 11/28/2021    HGB 16.6 11/28/2021    HCT 52.7 (H) 11/28/2021     11/28/2021    INR 1.1 11/28/2021       Chemistries  Lab Results   Component Value Date     (L) 11/28/2021    K 3.8 11/28/2021     11/28/2021    CREATININE 1.3 11/28/2021    BUN 23 (H) 11/28/2021    CO2 24 11/28/2021    GLUCOSE 120 (H) 11/28/2021    CALCIUM 9.2 11/28/2021    ALT 31 11/28/2021    AST 26 11/28/2021       Cholesterol  Lab Results   Component Value Date    CHOL 173 09/14/2021    TRIG 115 09/14/2021    HDL 44 (L) 09/14/2021    LDLCALC 106 (H) 09/14/2021    NONHDLCALC 129 09/14/2021       Endocrine  Lab Results   Component Value Date    TSH 3.75 11/28/2021    HGBA1C 5.8 (H) 09/14/2021       Cardiac Imaging    TRANSTHORACIC ECHO (TTE) COMPLETE 12/13/2021    Interpretation Summary  · Tricuspid valve structure is normal. No tricuspid valve regurgitation. Jet is insufficient to calculate pulmonary artery pressure.  · Normal-sized LV. Mild concentric left ventricular hypertrophy. Preserved LV systolic function. Estimated EF 60%. No regional wall motion abnormalities.  · Trace mitral valve regurgitation.  · Normal-sized RV. Normal RV systolic function.           Assessment/Plan     Palpitations  These have improved.  He did have some short episodes of SVT on his outpatient monitor but his symptoms actually correlated with sinus rhythm.  He has been having some trouble remembering to take his evening dose of metoprolol so we will switch him to metoprolol succinate.  I have  made no other changes to his medical regimen.    Elevated blood pressure reading  His blood pressure readings in the outpatient setting were reasonably well controlled.  As above, we will continue his metoprolol but switch it to the long-acting formulation.    Hyperlipidemia  His lipids are reasonably well controlled and he is tolerating his current statin dose.      New Medications Ordered This Visit   Medications   • metoprolol succinate XL 50 mg 24 hr tablet     Sig: Take 1 tablet (50 mg total) by mouth daily.     Dispense:  90 tablet     Refill:  3       Medications Discontinued During This Encounter   Medication Reason   • metoprolol tartrate 25 mg tablet        Orders Placed This Encounter   Procedures   • ECG 12 lead     Scheduling Instructions:      PLEASE USE THIS ORDER FOR ECG'S PERFORMED IN PHYSICIAN OFFICES     Order Specific Question:   Release to patient     Answer:   Immediate            Arelis FONTAINE , am scribing for, and in the presence of, Hebert Donahue Jr., MD.     Hebert FONTAINE Jr., MD, personally performed the services described in this documentation as scribed by Arelis Parrish  in my presence, and it is both accurate and complete.         Hebert Donahue Jr., MD  1/17/2022

## 2022-01-12 ENCOUNTER — OFFICE VISIT (OUTPATIENT)
Dept: CARDIOLOGY | Facility: CLINIC | Age: 57
End: 2022-01-12
Payer: COMMERCIAL

## 2022-01-12 VITALS
DIASTOLIC BLOOD PRESSURE: 80 MMHG | SYSTOLIC BLOOD PRESSURE: 124 MMHG | WEIGHT: 255.2 LBS | BODY MASS INDEX: 37.8 KG/M2 | HEART RATE: 81 BPM | OXYGEN SATURATION: 98 % | HEIGHT: 69 IN

## 2022-01-12 DIAGNOSIS — R03.0 ELEVATED BLOOD PRESSURE READING: ICD-10-CM

## 2022-01-12 DIAGNOSIS — E78.5 HYPERLIPIDEMIA, UNSPECIFIED HYPERLIPIDEMIA TYPE: ICD-10-CM

## 2022-01-12 DIAGNOSIS — R00.2 PALPITATIONS: Primary | ICD-10-CM

## 2022-01-12 PROCEDURE — 99214 OFFICE O/P EST MOD 30 MIN: CPT | Mod: 25 | Performed by: INTERNAL MEDICINE

## 2022-01-12 PROCEDURE — 3008F BODY MASS INDEX DOCD: CPT | Performed by: INTERNAL MEDICINE

## 2022-01-12 PROCEDURE — 93000 ELECTROCARDIOGRAM COMPLETE: CPT | Mod: XU | Performed by: INTERNAL MEDICINE

## 2022-01-12 RX ORDER — METOPROLOL SUCCINATE 50 MG/1
50 TABLET, EXTENDED RELEASE ORAL DAILY
Qty: 90 TABLET | Refills: 3 | Status: SHIPPED | OUTPATIENT
Start: 2022-01-12 | End: 2023-01-04

## 2022-01-12 ASSESSMENT — ENCOUNTER SYMPTOMS
DYSPNEA ON EXERTION: 0
DIZZINESS: 0
PALPITATIONS: 1
LIGHT-HEADEDNESS: 0

## 2022-01-12 NOTE — ASSESSMENT & PLAN NOTE
These have improved.  He did have some short episodes of SVT on his outpatient monitor but his symptoms actually correlated with sinus rhythm.  He has been having some trouble remembering to take his evening dose of metoprolol so we will switch him to metoprolol succinate.  I have made no other changes to his medical regimen.

## 2022-01-12 NOTE — LETTER
January 17, 2022     Emil Coats MD  [address]  [place] [address]    Patient: Cesar Cruz  YOB: 1965  Date of Visit: 1/12/2022      Dear Dr. Coats:    Thank you for referring Cesar Cruz to me for evaluation. Below are my notes for this consultation.    If you have questions, please do not hesitate to call me. I look forward to following your patient along with you.         Sincerely,        Hebert Donahue Jr., MD        CC: No Recipients  Hebert Donahue Jr., MD  1/17/2022  4:01 PM  Signed       Cardiology  Office Progress Note         Reason for visit:   Chief Complaint   Patient presents with   • Follow-up       HPI     Cesar Cruz is a 56 y.o. male who presents to the office for cardiovascular follow up and management of HTN.      He was last seen in the office on 12/9/21 following up from  His ER visit for palpitations and lightheadedness. He as having more headaches. His home Bps were elevated. He was questioning if his symptoms were due to decreasing his finasteride dose. He was to have an echo and wear an MCOT x 7 days.     Echo 12/13/21 was normal.   MCOT: 23 runs of SVT, longest 15 beats. 2 reported symptoms correlated to sinus rhythm. He states he took a nasal spray and stated he felt his heart racing.     He states he has had minor feelings of palpitations that he relates to position. It has lessened since his last visit. He denies having any chest pain, shortness of breath, edema or lightheadedness.     He has been checking his BP at home and it almanza ranged 113-130s/70-80s., HR 60-90s. He remains active at home but does not exercise regularly. He does have back pain issues.        Past Medical History:   Diagnosis Date   • Back pain    • BPH (benign prostatic hyperplasia)    • Lipid disorder    • Sinus tachycardia 11/28/2021    Evaluated in ED on 11/28/21 for palpitations, unexplained tachycardia.       Past Surgical History:    Procedure Laterality Date   • EPIDURAL BLOCK INJECTION      lumbar   • LIVER BIOPSY  1984   • VASECTOMY     • WISDOM TOOTH EXTRACTION         Social History     Tobacco Use   • Smoking status: Never Smoker   • Smokeless tobacco: Never Used   Vaping Use   • Vaping Use: Never used   Substance Use Topics   • Alcohol use: Not Currently     Comment: only on perez malcolm   • Drug use: Never       Family History   Problem Relation Age of Onset   • Diabetes Biological Mother    • Other Biological Mother         fatty liver   • Breast cancer Biological Mother         twice   • Multiple myeloma Biological Father    • Hypertension Biological Father         mild   • Obesity Biological Sister    • Thyroid cancer Biological Brother    • No Known Problems Biological Brother    • No Known Problems Biological Sister    • No Known Problems Biological Son    • No Known Problems Biological Son    • No Known Problems Biological Daughter        Allergies:  Ciprofloxacin    Current Outpatient Medications   Medication Sig Dispense Refill   • aspirin 81 mg enteric coated tablet 81 mg.     • famotidine (PEPCID) 20 mg tablet Take 1 tablet (20 mg total) by mouth daily. 90 tablet 3   • finasteride (PROSCAR) 5 mg tablet Take 5 mg by mouth daily. Taking every other day      • lidocaine (LIDODERM) 5 % patch Apply 1 patch topically daily. Remove & discard patch within 12 hours or as directed by presscriber. 20 patch 0   • simvastatin (ZOCOR) 10 mg tablet Take 1 tablet (10 mg total) by mouth nightly. 90 tablet 3   • tamsulosin (FLOMAX) 0.4 mg capsule Take 0.4 mg by mouth.     • metoprolol succinate XL 50 mg 24 hr tablet Take 1 tablet (50 mg total) by mouth daily. 90 tablet 3     No current facility-administered medications for this visit.       Review of Systems   Cardiovascular: Positive for palpitations. Negative for chest pain, dyspnea on exertion and leg swelling.   Neurological: Negative for dizziness and light-headedness.       Objective      Vitals:    01/12/22 0807   BP: 124/80   Pulse: 81   SpO2: 98%       Wt Readings from Last 3 Encounters:   01/12/22 116 kg (255 lb 3.2 oz)   12/13/21 113 kg (250 lb)   12/09/21 114 kg (250 lb 12.8 oz)       Body mass index is 37.69 kg/m².    Physical Exam  Constitutional:       General: He is not in acute distress.     Appearance: Normal appearance.   HENT:      Head: Normocephalic.   Eyes:      Extraocular Movements: Extraocular movements intact.   Neck:      Vascular: No JVD.   Cardiovascular:      Rate and Rhythm: Normal rate and regular rhythm.      Heart sounds: Normal heart sounds. No murmur heard.  Pulmonary:      Breath sounds: Normal breath sounds. No wheezing, rhonchi or rales.   Abdominal:      Palpations: Abdomen is soft.   Musculoskeletal:         General: No swelling.   Skin:     General: Skin is warm and dry.   Neurological:      Mental Status: He is alert.   Psychiatric:         Mood and Affect: Mood normal.          ECG   sinus rhythm    Hematology  Lab Results   Component Value Date    WBC 11.06 (H) 11/28/2021    HGB 16.6 11/28/2021    HCT 52.7 (H) 11/28/2021     11/28/2021    INR 1.1 11/28/2021       Chemistries  Lab Results   Component Value Date     (L) 11/28/2021    K 3.8 11/28/2021     11/28/2021    CREATININE 1.3 11/28/2021    BUN 23 (H) 11/28/2021    CO2 24 11/28/2021    GLUCOSE 120 (H) 11/28/2021    CALCIUM 9.2 11/28/2021    ALT 31 11/28/2021    AST 26 11/28/2021       Cholesterol  Lab Results   Component Value Date    CHOL 173 09/14/2021    TRIG 115 09/14/2021    HDL 44 (L) 09/14/2021    LDLCALC 106 (H) 09/14/2021    NONHDLCALC 129 09/14/2021       Endocrine  Lab Results   Component Value Date    TSH 3.75 11/28/2021    HGBA1C 5.8 (H) 09/14/2021       Cardiac Imaging    TRANSTHORACIC ECHO (TTE) COMPLETE 12/13/2021    Interpretation Summary  · Tricuspid valve structure is normal. No tricuspid valve regurgitation. Jet is insufficient to calculate pulmonary artery  pressure.  · Normal-sized LV. Mild concentric left ventricular hypertrophy. Preserved LV systolic function. Estimated EF 60%. No regional wall motion abnormalities.  · Trace mitral valve regurgitation.  · Normal-sized RV. Normal RV systolic function.           Assessment/Plan     Palpitations  These have improved.  He did have some short episodes of SVT on his outpatient monitor but his symptoms actually correlated with sinus rhythm.  He has been having some trouble remembering to take his evening dose of metoprolol so we will switch him to metoprolol succinate.  I have made no other changes to his medical regimen.    Elevated blood pressure reading  His blood pressure readings in the outpatient setting were reasonably well controlled.  As above, we will continue his metoprolol but switch it to the long-acting formulation.    Hyperlipidemia  His lipids are reasonably well controlled and he is tolerating his current statin dose.      New Medications Ordered This Visit   Medications   • metoprolol succinate XL 50 mg 24 hr tablet     Sig: Take 1 tablet (50 mg total) by mouth daily.     Dispense:  90 tablet     Refill:  3       Medications Discontinued During This Encounter   Medication Reason   • metoprolol tartrate 25 mg tablet        Orders Placed This Encounter   Procedures   • ECG 12 lead     Scheduling Instructions:      PLEASE USE THIS ORDER FOR ECG'S PERFORMED IN PHYSICIAN OFFICES     Order Specific Question:   Release to patient     Answer:   Immediate            Arelis FONTAINE , am scribing for, and in the presence of, Hebert Donahue Jr., MD.     Hebert FONTAINE Jr., MD, personally performed the services described in this documentation as scribed by Arelis Parrish  in my presence, and it is both accurate and complete.         Hebert Donahue Jr., MD  1/17/2022

## 2022-04-28 ENCOUNTER — OFFICE VISIT (OUTPATIENT)
Dept: PRIMARY CARE | Facility: CLINIC | Age: 57
End: 2022-04-28
Payer: COMMERCIAL

## 2022-04-28 VITALS
WEIGHT: 253 LBS | TEMPERATURE: 98 F | DIASTOLIC BLOOD PRESSURE: 76 MMHG | SYSTOLIC BLOOD PRESSURE: 142 MMHG | HEART RATE: 87 BPM | BODY MASS INDEX: 38.34 KG/M2 | HEIGHT: 68 IN | OXYGEN SATURATION: 94 % | RESPIRATION RATE: 16 BRPM

## 2022-04-28 DIAGNOSIS — J01.10 ACUTE NON-RECURRENT FRONTAL SINUSITIS: Primary | ICD-10-CM

## 2022-04-28 DIAGNOSIS — R05.9 COUGH: ICD-10-CM

## 2022-04-28 PROCEDURE — 3008F BODY MASS INDEX DOCD: CPT

## 2022-04-28 PROCEDURE — 99213 OFFICE O/P EST LOW 20 MIN: CPT

## 2022-04-28 RX ORDER — BENZONATATE 100 MG/1
100 CAPSULE ORAL 3 TIMES DAILY PRN
Qty: 30 CAPSULE | Refills: 0 | Status: SHIPPED | OUTPATIENT
Start: 2022-04-28 | End: 2022-05-08

## 2022-04-28 RX ORDER — AMOXICILLIN AND CLAVULANATE POTASSIUM 875; 125 MG/1; MG/1
1 TABLET, FILM COATED ORAL 2 TIMES DAILY
Qty: 14 TABLET | Refills: 0 | Status: SHIPPED | OUTPATIENT
Start: 2022-04-28 | End: 2022-05-05

## 2022-04-28 NOTE — PROGRESS NOTES
Subjective      Sinusitis (Pt present, c/o headache, sinus pressure, chest congestion cough with yellow mucous and post nasal drip. States he gets sinus infection around this time of year. Sx started over weekend. Neg covid test earlier this week )     Patient ID: Cesar Cruz is a 57 y.o. male presents today c/o:    HPI  4/23/22 started with sinus congestion, pressure, headache, fatigue, sore throat, productive cough,     Taking mucinex, tylenol, coricidin    Denies fever, chills, nausea, vomiting, diarrhea    The following have been reviewed and updated as appropriate in this visit:   Tobacco  Allergies  Meds  Problems  Med Hx  Surg Hx  Fam Hx         Review of Systems   All other systems reviewed and are negative.    Current Outpatient Medications   Medication Sig Dispense Refill   • amoxicillin-pot clavulanate (AUGMENTIN) 875-125 mg per tablet Take 1 tablet by mouth 2 (two) times a day for 7 days. 14 tablet 0   • aspirin 81 mg enteric coated tablet 81 mg.     • benzonatate (TESSALON PERLES) 100 mg capsule Take 1 capsule (100 mg total) by mouth 3 (three) times a day as needed for cough for up to 10 days. 30 capsule 0   • famotidine (PEPCID) 20 mg tablet Take 1 tablet (20 mg total) by mouth daily. 90 tablet 3   • finasteride (PROSCAR) 5 mg tablet Take 5 mg by mouth daily. Taking every other day      • lidocaine (LIDODERM) 5 % patch Apply 1 patch topically daily. Remove & discard patch within 12 hours or as directed by presscriber. 20 patch 0   • metoprolol succinate XL 50 mg 24 hr tablet Take 1 tablet (50 mg total) by mouth daily. 90 tablet 3   • simvastatin (ZOCOR) 10 mg tablet Take 1 tablet (10 mg total) by mouth nightly. 90 tablet 3   • tamsulosin (FLOMAX) 0.4 mg capsule Take 0.4 mg by mouth.       No current facility-administered medications for this visit.     Past Medical History:   Diagnosis Date   • Back pain    • BPH (benign prostatic hyperplasia)    • Lipid disorder    • Sinus tachycardia  "11/28/2021    Evaluated in ED on 11/28/21 for palpitations, unexplained tachycardia.     Family History   Problem Relation Age of Onset   • Diabetes Biological Mother    • Other Biological Mother         fatty liver   • Breast cancer Biological Mother         twice   • Multiple myeloma Biological Father    • Hypertension Biological Father         mild   • Obesity Biological Sister    • Thyroid cancer Biological Brother    • No Known Problems Biological Brother    • No Known Problems Biological Sister    • No Known Problems Biological Son    • No Known Problems Biological Son    • No Known Problems Biological Daughter      Allergies   Allergen Reactions   • Ciprofloxacin      joint pains     Past Surgical History:   Procedure Laterality Date   • EPIDURAL BLOCK INJECTION      lumbar   • LIVER BIOPSY  1984   • VASECTOMY     • WISDOM TOOTH EXTRACTION       Social History     Socioeconomic History   • Marital status:      Spouse name: None   • Number of children: None   • Years of education: None   • Highest education level: None   Occupational History   • Occupation: retired , now owns own company doing security consulting   Tobacco Use   • Smoking status: Never Smoker   • Smokeless tobacco: Never Used   Vaping Use   • Vaping Use: Never used   Substance and Sexual Activity   • Alcohol use: Not Currently     Comment: only on perez gold   • Drug use: Never   • Sexual activity: Yes     Partners: Female     Birth control/protection: None            Objective     Vitals:   Vitals:    04/28/22 0929   BP: (!) 142/76   Pulse: 87   Resp: 16   Temp: 36.7 °C (98 °F)   SpO2: 94%   Weight: 115 kg (253 lb)   Height: 1.727 m (5' 8\")       Physical Exam  Vitals and nursing note reviewed.   Constitutional:       Appearance: Normal appearance. He is normal weight.   HENT:      Head: Normocephalic and atraumatic.      Right Ear: Tympanic membrane normal.      Left Ear: Tympanic membrane normal.      Nose: Congestion " and rhinorrhea present.      Right Sinus: No frontal sinus tenderness.      Left Sinus: Maxillary sinus tenderness and frontal sinus tenderness present.      Comments: Pressure and pain concentrated on the left side.       Mouth/Throat:      Mouth: Mucous membranes are moist.   Eyes:      Extraocular Movements: Extraocular movements intact.      Conjunctiva/sclera: Conjunctivae normal.      Pupils: Pupils are equal, round, and reactive to light.   Cardiovascular:      Rate and Rhythm: Normal rate and regular rhythm.      Pulses: Normal pulses.      Heart sounds: Normal heart sounds. No murmur heard.  Pulmonary:      Effort: Pulmonary effort is normal.      Breath sounds: Normal breath sounds. No wheezing, rhonchi or rales.      Comments: Productive wet cough  Chest:      Chest wall: No tenderness.   Abdominal:      General: Abdomen is flat. Bowel sounds are normal.      Palpations: Abdomen is soft.      Tenderness: There is no abdominal tenderness.   Musculoskeletal:         General: Normal range of motion.      Cervical back: Normal range of motion and neck supple.   Lymphadenopathy:      Cervical: Cervical adenopathy present.      Right cervical: Superficial cervical adenopathy present.      Left cervical: Superficial cervical adenopathy present.   Skin:     General: Skin is warm and dry.   Neurological:      General: No focal deficit present.      Mental Status: He is alert and oriented to person, place, and time.   Psychiatric:         Mood and Affect: Mood normal.         Behavior: Behavior normal.         Thought Content: Thought content normal.         Judgment: Judgment normal.         Labs  Lab Results   Component Value Date    WBC 11.06 (H) 11/28/2021    HGB 16.6 11/28/2021    HCT 52.7 (H) 11/28/2021     11/28/2021    CHOL 173 09/14/2021    TRIG 115 09/14/2021    HDL 44 (L) 09/14/2021    LDLCALC 106 (H) 09/14/2021    ALT 31 11/28/2021    AST 26 11/28/2021     (L) 11/28/2021    K 3.8 11/28/2021     GLUCOSE 120 (H) 11/28/2021     11/28/2021    CREATININE 1.3 11/28/2021    BUN 23 (H) 11/28/2021    CO2 24 11/28/2021    TSH 3.75 11/28/2021    HGBA1C 5.8 (H) 09/14/2021    EGFR 56.9 (L) 11/28/2021    PSA 3.99 06/12/2020              Assessment/Plan   Cesar HERR was seen today for sinusitis.    Diagnoses and all orders for this visit:    Acute non-recurrent frontal sinusitis  -     amoxicillin-pot clavulanate (AUGMENTIN) 875-125 mg per tablet; Take 1 tablet by mouth 2 (two) times a day for 7 days.    Cough  -     benzonatate (TESSALON PERLES) 100 mg capsule; Take 1 capsule (100 mg total) by mouth 3 (three) times a day as needed for cough for up to 10 days.         Patient verbalizes understanding and agrees with plan of care today.        VIKASH Ozuna  4/28/2022

## 2022-06-27 NOTE — PROGRESS NOTES
Cardiology  Office Progress Note         Reason for visit:   Chief Complaint   Patient presents with   • Follow-up       HPI     Cesar Cruz is a 57 y.o. male who presents to the office for cardiovascular follow up and management of HTN.      He was last seen in the office on 1/12/22.  He had minor palpitations. He denied having any chest pain, shortness of breath, edema or lightheadedness. symptoms actually correlated with sinus rhythm.  He had trouble remembering to take his evening dose of metoprolol. I switched him to metoprolol succinate.      Today he reports feeling well.  He denies any recent palpitations.  He denies any other cardiac symptoms.  He is tolerating his medications without any adverse effects.    Past Medical History:   Diagnosis Date   • Back pain    • BPH (benign prostatic hyperplasia)    • Lipid disorder    • Sinus tachycardia 11/28/2021    Evaluated in ED on 11/28/21 for palpitations, unexplained tachycardia.       Past Surgical History:   Procedure Laterality Date   • EPIDURAL BLOCK INJECTION      lumbar   • LIVER BIOPSY  1984   • VASECTOMY     • WISDOM TOOTH EXTRACTION         Social History     Tobacco Use   • Smoking status: Never Smoker   • Smokeless tobacco: Never Used   Vaping Use   • Vaping Use: Never used   Substance Use Topics   • Alcohol use: Not Currently     Comment: only on perez gold   • Drug use: Never       Family History   Problem Relation Age of Onset   • Diabetes Biological Mother    • Other Biological Mother         fatty liver   • Breast cancer Biological Mother         twice   • Multiple myeloma Biological Father    • Hypertension Biological Father         mild   • Obesity Biological Sister    • Thyroid cancer Biological Brother    • No Known Problems Biological Brother    • No Known Problems Biological Sister    • No Known Problems Biological Son    • No Known Problems Biological Son    • No Known Problems Biological Daughter         Allergies:  Ciprofloxacin    Current Outpatient Medications   Medication Sig Dispense Refill   • aspirin 81 mg enteric coated tablet 81 mg. Every other day     • famotidine (PEPCID) 20 mg tablet Take 20 mg by mouth 2 (two) times a day. One daily     • finasteride (PROSCAR) 5 mg tablet Take 5 mg by mouth daily. Taking every other day      • lidocaine (LIDODERM) 5 % patch Apply 1 patch topically daily. Remove & discard patch within 12 hours or as directed by presscriber. 20 patch 0   • metoprolol succinate XL 50 mg 24 hr tablet Take 1 tablet (50 mg total) by mouth daily. 90 tablet 3   • simvastatin (ZOCOR) 10 mg tablet Take 10 mg by mouth nightly. One daily     • tamsulosin (FLOMAX) 0.4 mg capsule Take 0.4 mg by mouth.       No current facility-administered medications for this visit.       Review of Systems   Cardiovascular: Negative for chest pain, dyspnea on exertion, leg swelling, near-syncope, orthopnea, palpitations, paroxysmal nocturnal dyspnea and syncope.   Respiratory: Negative for shortness of breath.    Musculoskeletal: Negative for myalgias.   Gastrointestinal: Negative for hematemesis and hematochezia.   Genitourinary: Negative for hematuria.   Neurological: Negative for dizziness and light-headedness.       Objective     Vitals:    07/12/22 0853   BP: 122/76   Pulse: 91   SpO2: 96%       Wt Readings from Last 3 Encounters:   07/12/22 114 kg (252 lb)   04/28/22 115 kg (253 lb)   01/12/22 116 kg (255 lb 3.2 oz)       Body mass index is 37.21 kg/m².    Physical Exam  Constitutional:       General: He is not in acute distress.     Appearance: Normal appearance.   HENT:      Head: Normocephalic.   Eyes:      Extraocular Movements: Extraocular movements intact.   Neck:      Vascular: No JVD.   Cardiovascular:      Rate and Rhythm: Normal rate and regular rhythm.      Heart sounds: Normal heart sounds. No murmur heard.  Pulmonary:      Breath sounds: Normal breath sounds. No wheezing, rhonchi or rales.    Abdominal:      Palpations: Abdomen is soft.   Musculoskeletal:         General: No swelling.   Skin:     General: Skin is warm and dry.   Neurological:      Mental Status: He is alert.   Psychiatric:         Mood and Affect: Mood normal.          ECG   sinus rhythm    Hematology  Lab Results   Component Value Date    WBC 11.06 (H) 11/28/2021    HGB 16.6 11/28/2021    HCT 52.7 (H) 11/28/2021     11/28/2021    INR 1.1 11/28/2021       Chemistries  Lab Results   Component Value Date     (L) 11/28/2021    K 3.8 11/28/2021     11/28/2021    CREATININE 1.3 11/28/2021    BUN 23 (H) 11/28/2021    CO2 24 11/28/2021    GLUCOSE 120 (H) 11/28/2021    CALCIUM 9.2 11/28/2021    ALT 31 11/28/2021    AST 26 11/28/2021       Cholesterol  Lab Results   Component Value Date    CHOL 173 09/14/2021    TRIG 115 09/14/2021    HDL 44 (L) 09/14/2021    LDLCALC 106 (H) 09/14/2021    NONHDLCALC 129 09/14/2021       Endocrine  Lab Results   Component Value Date    TSH 3.75 11/28/2021    HGBA1C 5.8 (H) 09/14/2021       Cardiac Imaging    TRANSTHORACIC ECHO (TTE) COMPLETE 12/13/2021    Interpretation Summary  · Tricuspid valve structure is normal. No tricuspid valve regurgitation. Jet is insufficient to calculate pulmonary artery pressure.  · Normal-sized LV. Mild concentric left ventricular hypertrophy. Preserved LV systolic function. Estimated EF 60%. No regional wall motion abnormalities.  · Trace mitral valve regurgitation.  · Normal-sized RV. Normal RV systolic function.           Assessment/Plan     Palpitations  These are well controlled on his current metoprolol dose.  I made no changes to his medical regimen.    Elevated blood pressure reading  His blood pressure is currently normal.  We will continue his current metoprolol dose.    Hyperlipidemia  His lipids are reasonably well controlled and he is tolerating his current statin dose.        Orders Placed This Encounter   Procedures   • ECG 12 LEAD-OFFICE PERFORMED      Scheduling Instructions:      PLEASE USE THIS ORDER FOR ECG'S PERFORMED IN PHYSICIAN OFFICES     Order Specific Question:   Release to patient     Answer:   Immediate       Follow Up Plans:  Return in about 1 year (around 7/12/2023).               Hebert Donahue Jr., MD  7/12/2022

## 2022-07-12 ENCOUNTER — OFFICE VISIT (OUTPATIENT)
Dept: CARDIOLOGY | Facility: CLINIC | Age: 57
End: 2022-07-12
Payer: COMMERCIAL

## 2022-07-12 VITALS
DIASTOLIC BLOOD PRESSURE: 76 MMHG | BODY MASS INDEX: 37.33 KG/M2 | OXYGEN SATURATION: 96 % | SYSTOLIC BLOOD PRESSURE: 122 MMHG | WEIGHT: 252 LBS | HEIGHT: 69 IN | HEART RATE: 91 BPM

## 2022-07-12 DIAGNOSIS — R03.0 ELEVATED BLOOD PRESSURE READING: ICD-10-CM

## 2022-07-12 DIAGNOSIS — E78.5 HYPERLIPIDEMIA, UNSPECIFIED HYPERLIPIDEMIA TYPE: ICD-10-CM

## 2022-07-12 DIAGNOSIS — R00.2 PALPITATIONS: Primary | ICD-10-CM

## 2022-07-12 PROCEDURE — 3008F BODY MASS INDEX DOCD: CPT | Performed by: INTERNAL MEDICINE

## 2022-07-12 PROCEDURE — 99213 OFFICE O/P EST LOW 20 MIN: CPT | Performed by: INTERNAL MEDICINE

## 2022-07-12 PROCEDURE — 93000 ELECTROCARDIOGRAM COMPLETE: CPT | Performed by: INTERNAL MEDICINE

## 2022-07-12 RX ORDER — FAMOTIDINE 20 MG/1
20 TABLET, FILM COATED ORAL DAILY
COMMUNITY

## 2022-07-12 RX ORDER — SIMVASTATIN 10 MG/1
10 TABLET, FILM COATED ORAL NIGHTLY
COMMUNITY
End: 2022-11-21

## 2022-07-12 ASSESSMENT — ENCOUNTER SYMPTOMS
MYALGIAS: 0
ORTHOPNEA: 0
NEAR-SYNCOPE: 0
DIZZINESS: 0
HEMATURIA: 0
HEMATOCHEZIA: 0
SHORTNESS OF BREATH: 0
LIGHT-HEADEDNESS: 0
PALPITATIONS: 0
HEMATEMESIS: 0
PND: 0
DYSPNEA ON EXERTION: 0
SYNCOPE: 0

## 2022-07-12 NOTE — LETTER
July 12, 2022     Emil Coats MD  [address]  [place] [address]    Patient: Cesar Cruz  YOB: 1965  Date of Visit: 7/12/2022      Dear Dr. Coats:    Thank you for referring Cesar Cruz to me for evaluation. Below are my notes for this consultation.    If you have questions, please do not hesitate to call me. I look forward to following your patient along with you.         Sincerely,        Hebert Donahue Jr., MD        CC: No Recipients  Hebert Donahue Jr., MD  7/12/2022  9:48 AM  Signed       Cardiology  Office Progress Note         Reason for visit:   Chief Complaint   Patient presents with   • Follow-up       HPI     Cesar Cruz is a 57 y.o. male who presents to the office for cardiovascular follow up and management of HTN.      He was last seen in the office on 1/12/22.  He had minor palpitations. He denied having any chest pain, shortness of breath, edema or lightheadedness. symptoms actually correlated with sinus rhythm.  He had trouble remembering to take his evening dose of metoprolol. I switched him to metoprolol succinate.      Today he reports feeling well.  He denies any recent palpitations.  He denies any other cardiac symptoms.  He is tolerating his medications without any adverse effects.    Past Medical History:   Diagnosis Date   • Back pain    • BPH (benign prostatic hyperplasia)    • Lipid disorder    • Sinus tachycardia 11/28/2021    Evaluated in ED on 11/28/21 for palpitations, unexplained tachycardia.       Past Surgical History:   Procedure Laterality Date   • EPIDURAL BLOCK INJECTION      lumbar   • LIVER BIOPSY  1984   • VASECTOMY     • WISDOM TOOTH EXTRACTION         Social History     Tobacco Use   • Smoking status: Never Smoker   • Smokeless tobacco: Never Used   Vaping Use   • Vaping Use: Never used   Substance Use Topics   • Alcohol use: Not Currently     Comment: only on perez gold   • Drug use: Never        Family History   Problem Relation Age of Onset   • Diabetes Biological Mother    • Other Biological Mother         fatty liver   • Breast cancer Biological Mother         twice   • Multiple myeloma Biological Father    • Hypertension Biological Father         mild   • Obesity Biological Sister    • Thyroid cancer Biological Brother    • No Known Problems Biological Brother    • No Known Problems Biological Sister    • No Known Problems Biological Son    • No Known Problems Biological Son    • No Known Problems Biological Daughter        Allergies:  Ciprofloxacin    Current Outpatient Medications   Medication Sig Dispense Refill   • aspirin 81 mg enteric coated tablet 81 mg. Every other day     • famotidine (PEPCID) 20 mg tablet Take 20 mg by mouth 2 (two) times a day. One daily     • finasteride (PROSCAR) 5 mg tablet Take 5 mg by mouth daily. Taking every other day      • lidocaine (LIDODERM) 5 % patch Apply 1 patch topically daily. Remove & discard patch within 12 hours or as directed by presscriber. 20 patch 0   • metoprolol succinate XL 50 mg 24 hr tablet Take 1 tablet (50 mg total) by mouth daily. 90 tablet 3   • simvastatin (ZOCOR) 10 mg tablet Take 10 mg by mouth nightly. One daily     • tamsulosin (FLOMAX) 0.4 mg capsule Take 0.4 mg by mouth.       No current facility-administered medications for this visit.       Review of Systems   Cardiovascular: Negative for chest pain, dyspnea on exertion, leg swelling, near-syncope, orthopnea, palpitations, paroxysmal nocturnal dyspnea and syncope.   Respiratory: Negative for shortness of breath.    Musculoskeletal: Negative for myalgias.   Gastrointestinal: Negative for hematemesis and hematochezia.   Genitourinary: Negative for hematuria.   Neurological: Negative for dizziness and light-headedness.       Objective     Vitals:    07/12/22 0853   BP: 122/76   Pulse: 91   SpO2: 96%       Wt Readings from Last 3 Encounters:   07/12/22 114 kg (252 lb)   04/28/22 115  kg (253 lb)   01/12/22 116 kg (255 lb 3.2 oz)       Body mass index is 37.21 kg/m².    Physical Exam  Constitutional:       General: He is not in acute distress.     Appearance: Normal appearance.   HENT:      Head: Normocephalic.   Eyes:      Extraocular Movements: Extraocular movements intact.   Neck:      Vascular: No JVD.   Cardiovascular:      Rate and Rhythm: Normal rate and regular rhythm.      Heart sounds: Normal heart sounds. No murmur heard.  Pulmonary:      Breath sounds: Normal breath sounds. No wheezing, rhonchi or rales.   Abdominal:      Palpations: Abdomen is soft.   Musculoskeletal:         General: No swelling.   Skin:     General: Skin is warm and dry.   Neurological:      Mental Status: He is alert.   Psychiatric:         Mood and Affect: Mood normal.          ECG   sinus rhythm    Hematology  Lab Results   Component Value Date    WBC 11.06 (H) 11/28/2021    HGB 16.6 11/28/2021    HCT 52.7 (H) 11/28/2021     11/28/2021    INR 1.1 11/28/2021       Chemistries  Lab Results   Component Value Date     (L) 11/28/2021    K 3.8 11/28/2021     11/28/2021    CREATININE 1.3 11/28/2021    BUN 23 (H) 11/28/2021    CO2 24 11/28/2021    GLUCOSE 120 (H) 11/28/2021    CALCIUM 9.2 11/28/2021    ALT 31 11/28/2021    AST 26 11/28/2021       Cholesterol  Lab Results   Component Value Date    CHOL 173 09/14/2021    TRIG 115 09/14/2021    HDL 44 (L) 09/14/2021    LDLCALC 106 (H) 09/14/2021    NONHDLCALC 129 09/14/2021       Endocrine  Lab Results   Component Value Date    TSH 3.75 11/28/2021    HGBA1C 5.8 (H) 09/14/2021       Cardiac Imaging    TRANSTHORACIC ECHO (TTE) COMPLETE 12/13/2021    Interpretation Summary  · Tricuspid valve structure is normal. No tricuspid valve regurgitation. Jet is insufficient to calculate pulmonary artery pressure.  · Normal-sized LV. Mild concentric left ventricular hypertrophy. Preserved LV systolic function. Estimated EF 60%. No regional wall motion  abnormalities.  · Trace mitral valve regurgitation.  · Normal-sized RV. Normal RV systolic function.           Assessment/Plan     Palpitations  These are well controlled on his current metoprolol dose.  I made no changes to his medical regimen.    Elevated blood pressure reading  His blood pressure is currently normal.  We will continue his current metoprolol dose.    Hyperlipidemia  His lipids are reasonably well controlled and he is tolerating his current statin dose.        Orders Placed This Encounter   Procedures   • ECG 12 LEAD-OFFICE PERFORMED     Scheduling Instructions:      PLEASE USE THIS ORDER FOR ECG'S PERFORMED IN PHYSICIAN OFFICES     Order Specific Question:   Release to patient     Answer:   Immediate       Follow Up Plans:  Return in about 1 year (around 7/12/2023).               Hebert Donahue Jr., MD  7/12/2022

## 2022-11-21 RX ORDER — SIMVASTATIN 10 MG/1
TABLET, FILM COATED ORAL
Qty: 90 TABLET | Refills: 1 | Status: SHIPPED | OUTPATIENT
Start: 2022-11-21 | End: 2023-05-16

## 2023-01-04 RX ORDER — METOPROLOL SUCCINATE 50 MG/1
TABLET, EXTENDED RELEASE ORAL
Qty: 90 TABLET | Refills: 3 | Status: SHIPPED | OUTPATIENT
Start: 2023-01-04 | End: 2023-11-15

## 2023-01-04 NOTE — TELEPHONE ENCOUNTER
[facility] Refill Request      MA Notes: patient has not had updated labs       Nurse Notes:      Last Office Visit: 7/12/2022  Last Telemedicine Visit: Visit date not found    Next Office Visit: Visit date not found  Next Telemedicine Visit: Visit date not found     Most Recent BP Readings:  BP Readings from Last 3 Encounters:   07/12/22 122/76   04/28/22 (!) 142/76   01/12/22 124/80       Most recent Lab results:  Lab Results   Component Value Date    CHOL 173 09/14/2021    HDL 44 (L) 09/14/2021    TRIG 115 09/14/2021    NONHDLCALC 129 09/14/2021       Lab Results   Component Value Date    AST 26 11/28/2021    ALT 31 11/28/2021       Lab Results   Component Value Date     (L) 11/28/2021    K 3.8 11/28/2021    BUN 23 (H) 11/28/2021    CREATININE 1.3 11/28/2021       Current Medications:    Current Outpatient Medications:   •  aspirin 81 mg enteric coated tablet, 81 mg. Every other day, Disp: , Rfl:   •  famotidine (PEPCID) 20 mg tablet, Take 20 mg by mouth 2 (two) times a day. One daily, Disp: , Rfl:   •  finasteride (PROSCAR) 5 mg tablet, Take 5 mg by mouth daily. Taking every other day , Disp: , Rfl:   •  lidocaine (LIDODERM) 5 % patch, Apply 1 patch topically daily. Remove & discard patch within 12 hours or as directed by presscriber., Disp: 20 patch, Rfl: 0  •  metoprolol succinate XL 50 mg 24 hr tablet, Take 1 tablet (50 mg total) by mouth daily., Disp: 90 tablet, Rfl: 3  •  simvastatin (ZOCOR) 10 mg tablet, TAKE 1 TABLET BY MOUTH EVERY DAY AT NIGHT, Disp: 90 tablet, Rfl: 1  •  tamsulosin (FLOMAX) 0.4 mg capsule, Take 0.4 mg by mouth., Disp: , Rfl:

## 2023-05-16 RX ORDER — SIMVASTATIN 10 MG/1
TABLET, FILM COATED ORAL
Qty: 90 TABLET | Refills: 0 | Status: SHIPPED | OUTPATIENT
Start: 2023-05-16 | End: 2023-08-16

## 2023-05-16 NOTE — TELEPHONE ENCOUNTER
Medicine Refill Request    Last Office: 4/28/2022   Last Consult Visit: Visit date not found  Last Telemedicine Visit: 6/11/2020 Emil Coats MD    Next Appointment: Visit date not found      Current Outpatient Medications:   •  aspirin 81 mg enteric coated tablet, 81 mg. Every other day, Disp: , Rfl:   •  famotidine (PEPCID) 20 mg tablet, Take 20 mg by mouth 2 (two) times a day. One daily, Disp: , Rfl:   •  finasteride (PROSCAR) 5 mg tablet, Take 5 mg by mouth daily. Taking every other day , Disp: , Rfl:   •  lidocaine (LIDODERM) 5 % patch, Apply 1 patch topically daily. Remove & discard patch within 12 hours or as directed by presscriber., Disp: 20 patch, Rfl: 0  •  metoprolol succinate XL (TOPROL-XL) 50 mg 24 hr tablet, TAKE 1 TABLET BY MOUTH EVERY DAY, Disp: 90 tablet, Rfl: 3  •  simvastatin (ZOCOR) 10 mg tablet, TAKE 1 TABLET BY MOUTH EVERY DAY AT NIGHT, Disp: 90 tablet, Rfl: 1  •  tamsulosin (FLOMAX) 0.4 mg capsule, Take 0.4 mg by mouth., Disp: , Rfl:       BP Readings from Last 3 Encounters:   07/12/22 122/76   04/28/22 (!) 142/76   01/12/22 124/80       Recent Lab results:  Lab Results   Component Value Date    CHOL 173 09/14/2021   ,   Lab Results   Component Value Date    HDL 44 (L) 09/14/2021   ,   Lab Results   Component Value Date    LDLCALC 106 (H) 09/14/2021   ,   Lab Results   Component Value Date    TRIG 115 09/14/2021        Lab Results   Component Value Date    GLUCOSE 120 (H) 11/28/2021   ,   Lab Results   Component Value Date    HGBA1C 5.8 (H) 09/14/2021         Lab Results   Component Value Date    CREATININE 1.3 11/28/2021       Lab Results   Component Value Date    TSH 3.75 11/28/2021

## 2023-07-17 NOTE — PROGRESS NOTES
Cardiology  Office Progress Note         Reason for visit:   Chief Complaint   Patient presents with   • Follow-up       HPI     Cesar Cruz is a 58 y.o. male who presents to the office for cardiovascular follow up and management of HTN.      He was last seen in the office 7/12/22.  He denied having any  cardiac symptoms.  He was tolerating his medications without any adverse effects. No changes made.     He denies having any cardiac complaints. He is tolerating his medications. He will be seeing his PCP in August and will have updated labs.        Past Medical History:   Diagnosis Date   • Back pain    • BPH (benign prostatic hyperplasia)    • Lipid disorder    • Sinus tachycardia 11/28/2021    Evaluated in ED on 11/28/21 for palpitations, unexplained tachycardia.       Past Surgical History:   Procedure Laterality Date   • EPIDURAL BLOCK INJECTION      lumbar   • LIVER BIOPSY  1984   • VASECTOMY     • WISDOM TOOTH EXTRACTION         Social History     Tobacco Use   • Smoking status: Never   • Smokeless tobacco: Never   Vaping Use   • Vaping Use: Never used   Substance Use Topics   • Alcohol use: Not Currently     Comment: only on perez gold   • Drug use: Never       Family History   Problem Relation Age of Onset   • Diabetes Biological Mother    • Other Biological Mother         fatty liver   • Breast cancer Biological Mother         twice   • Multiple myeloma Biological Father    • Hypertension Biological Father         mild   • Obesity Biological Sister    • Thyroid cancer Biological Brother    • No Known Problems Biological Brother    • No Known Problems Biological Sister    • No Known Problems Biological Son    • No Known Problems Biological Son    • No Known Problems Biological Daughter        Allergies:  Ciprofloxacin    Current Outpatient Medications   Medication Sig Dispense Refill   • aspirin 81 mg enteric coated tablet 81 mg. Every other day     • famotidine (PEPCID) 20 mg tablet Take 20  mg by mouth daily. One daily     • finasteride (PROSCAR) 5 mg tablet Take 5 mg by mouth daily. Taking every other day      • lidocaine (LIDODERM) 5 % patch Apply 1 patch topically daily. Remove & discard patch within 12 hours or as directed by presscriber. 20 patch 0   • metoprolol succinate XL (TOPROL-XL) 50 mg 24 hr tablet TAKE 1 TABLET BY MOUTH EVERY DAY 90 tablet 3   • simvastatin (ZOCOR) 10 mg tablet TAKE 1 TABLET BY MOUTH EVERY DAY AT NIGHT 90 tablet 0   • tamsulosin (FLOMAX) 0.4 mg capsule Take 0.4 mg by mouth.       No current facility-administered medications for this visit.       Review of Systems   Cardiovascular: Negative for chest pain, dyspnea on exertion, leg swelling and palpitations.   Neurological: Positive for light-headedness. Negative for dizziness.       Objective     Vitals:    07/26/23 0910   BP: 118/78   Pulse: 88   SpO2: 98%       Wt Readings from Last 5 Encounters:   07/26/23 116 kg (255 lb 3.2 oz)   07/12/22 114 kg (252 lb)   04/28/22 115 kg (253 lb)   01/12/22 116 kg (255 lb 3.2 oz)   12/13/21 113 kg (250 lb)       Body mass index is 36.62 kg/m².    Physical Exam  Constitutional:       General: He is not in acute distress.     Appearance: Normal appearance.   HENT:      Head: Normocephalic.   Eyes:      Extraocular Movements: Extraocular movements intact.   Neck:      Vascular: No JVD.   Cardiovascular:      Rate and Rhythm: Normal rate and regular rhythm.      Heart sounds: Normal heart sounds. No murmur heard.  Pulmonary:      Breath sounds: Normal breath sounds. No wheezing, rhonchi or rales.   Abdominal:      Palpations: Abdomen is soft.   Musculoskeletal:         General: No swelling.   Skin:     General: Skin is warm and dry.   Neurological:      Mental Status: He is alert.   Psychiatric:         Mood and Affect: Mood normal.          ECG   sinus rhythm    Hematology  Lab Results   Component Value Date    WBC 11.06 (H) 11/28/2021    HGB 16.6 11/28/2021    HCT 52.7 (H) 11/28/2021      11/28/2021    INR 1.1 11/28/2021       Chemistries  Lab Results   Component Value Date     (L) 11/28/2021    K 3.8 11/28/2021     11/28/2021    CREATININE 1.3 11/28/2021    BUN 23 (H) 11/28/2021    CO2 24 11/28/2021    GLUCOSE 120 (H) 11/28/2021    CALCIUM 9.2 11/28/2021    ALT 31 11/28/2021    AST 26 11/28/2021       Cholesterol  Lab Results   Component Value Date    CHOL 173 09/14/2021    TRIG 115 09/14/2021    HDL 44 (L) 09/14/2021    LDLCALC 106 (H) 09/14/2021    NONHDLCALC 129 09/14/2021       Endocrine  Lab Results   Component Value Date    TSH 3.75 11/28/2021    HGBA1C 5.8 (H) 09/14/2021       Cardiac Imaging    TRANSTHORACIC ECHO (TTE) COMPLETE 12/13/2021    Interpretation Summary  · Tricuspid valve structure is normal. No tricuspid valve regurgitation. Jet is insufficient to calculate pulmonary artery pressure.  · Normal-sized LV. Mild concentric left ventricular hypertrophy. Preserved LV systolic function. Estimated EF 60%. No regional wall motion abnormalities.  · Trace mitral valve regurgitation.  · Normal-sized RV. Normal RV systolic function.      Assessment/Plan     Palpitations  These are well controlled on his current metoprolol dose.  I made no changes to his medical regimen.    Hyperlipidemia  He is tolerating his current statin dose.  He will have updated blood work in the near future through his PCP.        Orders Placed This Encounter   Procedures   • ECG 12 lead     Scheduling Instructions:      PLEASE USE THIS ORDER FOR ECG'S PERFORMED IN PHYSICIAN OFFICES       Follow Up Plans:  Return in about 1 year (around 7/26/2024).          I, Arelis Parrish , am scribing for, and in the presence of, Hebert Donahue Jr., MD.     I, Hebert Donahue Jr., MD, personally performed the services described in this documentation as scribed by Arelis Parrish  in my presence, and it is both accurate and complete.             Hebert Donahue Jr., MD    7/26/2023

## 2023-07-26 ENCOUNTER — OFFICE VISIT (OUTPATIENT)
Dept: CARDIOLOGY | Facility: CLINIC | Age: 58
End: 2023-07-26
Payer: COMMERCIAL

## 2023-07-26 VITALS
HEIGHT: 70 IN | SYSTOLIC BLOOD PRESSURE: 118 MMHG | DIASTOLIC BLOOD PRESSURE: 78 MMHG | BODY MASS INDEX: 36.54 KG/M2 | OXYGEN SATURATION: 98 % | HEART RATE: 88 BPM | WEIGHT: 255.2 LBS

## 2023-07-26 DIAGNOSIS — R00.2 PALPITATIONS: Primary | ICD-10-CM

## 2023-07-26 DIAGNOSIS — E78.5 HYPERLIPIDEMIA, UNSPECIFIED HYPERLIPIDEMIA TYPE: ICD-10-CM

## 2023-07-26 LAB
ATRIAL RATE: 79
P AXIS: 7
PR INTERVAL: 162
QRS DURATION: 78
QT INTERVAL: 368
QTC CALCULATION(BAZETT): 421
R AXIS: 60
T WAVE AXIS: 1
VENTRICULAR RATE: 79

## 2023-07-26 PROCEDURE — 93000 ELECTROCARDIOGRAM COMPLETE: CPT | Performed by: INTERNAL MEDICINE

## 2023-07-26 PROCEDURE — 3008F BODY MASS INDEX DOCD: CPT | Performed by: INTERNAL MEDICINE

## 2023-07-26 PROCEDURE — 99214 OFFICE O/P EST MOD 30 MIN: CPT | Performed by: INTERNAL MEDICINE

## 2023-07-26 ASSESSMENT — ENCOUNTER SYMPTOMS
PALPITATIONS: 0
DYSPNEA ON EXERTION: 0
LIGHT-HEADEDNESS: 1
DIZZINESS: 0

## 2023-07-26 NOTE — LETTER
July 26, 2023     Emil Coats MD  [address]  [place] [address]    Patient: Cesar Cruz  YOB: 1965  Date of Visit: 7/26/2023      Dear Dr. Coats:    Thank you for referring Cesar Cruz to me for evaluation. Below are my notes for this consultation.    If you have questions, please do not hesitate to call me. I look forward to following your patient along with you.         Sincerely,        Hebert Donahue Jr., MD        CC: No Recipients    Hebert Donahue Jr., MD  7/26/2023  9:35 AM  Signed       Cardiology  Office Progress Note         Reason for visit:   Chief Complaint   Patient presents with   • Follow-up       HPI     Cesar Cruz is a 58 y.o. male who presents to the office for cardiovascular follow up and management of HTN.      He was last seen in the office 7/12/22.  He denied having any  cardiac symptoms.  He was tolerating his medications without any adverse effects. No changes made.     He denies having any cardiac complaints. He is tolerating his medications. He will be seeing his PCP in August and will have updated labs.        Past Medical History:   Diagnosis Date   • Back pain    • BPH (benign prostatic hyperplasia)    • Lipid disorder    • Sinus tachycardia 11/28/2021    Evaluated in ED on 11/28/21 for palpitations, unexplained tachycardia.       Past Surgical History:   Procedure Laterality Date   • EPIDURAL BLOCK INJECTION      lumbar   • LIVER BIOPSY  1984   • VASECTOMY     • WISDOM TOOTH EXTRACTION         Social History     Tobacco Use   • Smoking status: Never   • Smokeless tobacco: Never   Vaping Use   • Vaping Use: Never used   Substance Use Topics   • Alcohol use: Not Currently     Comment: only on perez gold   • Drug use: Never       Family History   Problem Relation Age of Onset   • Diabetes Biological Mother    • Other Biological Mother         fatty liver   • Breast cancer Biological Mother         twice   •  Multiple myeloma Biological Father    • Hypertension Biological Father         mild   • Obesity Biological Sister    • Thyroid cancer Biological Brother    • No Known Problems Biological Brother    • No Known Problems Biological Sister    • No Known Problems Biological Son    • No Known Problems Biological Son    • No Known Problems Biological Daughter        Allergies:  Ciprofloxacin    Current Outpatient Medications   Medication Sig Dispense Refill   • aspirin 81 mg enteric coated tablet 81 mg. Every other day     • famotidine (PEPCID) 20 mg tablet Take 20 mg by mouth daily. One daily     • finasteride (PROSCAR) 5 mg tablet Take 5 mg by mouth daily. Taking every other day      • lidocaine (LIDODERM) 5 % patch Apply 1 patch topically daily. Remove & discard patch within 12 hours or as directed by presscriber. 20 patch 0   • metoprolol succinate XL (TOPROL-XL) 50 mg 24 hr tablet TAKE 1 TABLET BY MOUTH EVERY DAY 90 tablet 3   • simvastatin (ZOCOR) 10 mg tablet TAKE 1 TABLET BY MOUTH EVERY DAY AT NIGHT 90 tablet 0   • tamsulosin (FLOMAX) 0.4 mg capsule Take 0.4 mg by mouth.       No current facility-administered medications for this visit.       Review of Systems   Cardiovascular: Negative for chest pain, dyspnea on exertion, leg swelling and palpitations.   Neurological: Positive for light-headedness. Negative for dizziness.       Objective     Vitals:    07/26/23 0910   BP: 118/78   Pulse: 88   SpO2: 98%       Wt Readings from Last 5 Encounters:   07/26/23 116 kg (255 lb 3.2 oz)   07/12/22 114 kg (252 lb)   04/28/22 115 kg (253 lb)   01/12/22 116 kg (255 lb 3.2 oz)   12/13/21 113 kg (250 lb)       Body mass index is 36.62 kg/m².    Physical Exam  Constitutional:       General: He is not in acute distress.     Appearance: Normal appearance.   HENT:      Head: Normocephalic.   Eyes:      Extraocular Movements: Extraocular movements intact.   Neck:      Vascular: No JVD.   Cardiovascular:      Rate and Rhythm: Normal  rate and regular rhythm.      Heart sounds: Normal heart sounds. No murmur heard.  Pulmonary:      Breath sounds: Normal breath sounds. No wheezing, rhonchi or rales.   Abdominal:      Palpations: Abdomen is soft.   Musculoskeletal:         General: No swelling.   Skin:     General: Skin is warm and dry.   Neurological:      Mental Status: He is alert.   Psychiatric:         Mood and Affect: Mood normal.          ECG   sinus rhythm    Hematology  Lab Results   Component Value Date    WBC 11.06 (H) 11/28/2021    HGB 16.6 11/28/2021    HCT 52.7 (H) 11/28/2021     11/28/2021    INR 1.1 11/28/2021       Chemistries  Lab Results   Component Value Date     (L) 11/28/2021    K 3.8 11/28/2021     11/28/2021    CREATININE 1.3 11/28/2021    BUN 23 (H) 11/28/2021    CO2 24 11/28/2021    GLUCOSE 120 (H) 11/28/2021    CALCIUM 9.2 11/28/2021    ALT 31 11/28/2021    AST 26 11/28/2021       Cholesterol  Lab Results   Component Value Date    CHOL 173 09/14/2021    TRIG 115 09/14/2021    HDL 44 (L) 09/14/2021    LDLCALC 106 (H) 09/14/2021    NONHDLCALC 129 09/14/2021       Endocrine  Lab Results   Component Value Date    TSH 3.75 11/28/2021    HGBA1C 5.8 (H) 09/14/2021       Cardiac Imaging    TRANSTHORACIC ECHO (TTE) COMPLETE 12/13/2021    Interpretation Summary  · Tricuspid valve structure is normal. No tricuspid valve regurgitation. Jet is insufficient to calculate pulmonary artery pressure.  · Normal-sized LV. Mild concentric left ventricular hypertrophy. Preserved LV systolic function. Estimated EF 60%. No regional wall motion abnormalities.  · Trace mitral valve regurgitation.  · Normal-sized RV. Normal RV systolic function.      Assessment/Plan     Palpitations  These are well controlled on his current metoprolol dose.  I made no changes to his medical regimen.    Hyperlipidemia  He is tolerating his current statin dose.  He will have updated blood work in the near future through his PCP.        Orders  Placed This Encounter   Procedures   • ECG 12 lead     Scheduling Instructions:      PLEASE USE THIS ORDER FOR ECG'S PERFORMED IN PHYSICIAN OFFICES       Follow Up Plans:  Return in about 1 year (around 7/26/2024).         I, Arelis Parrish , am scribing for, and in the presence of, Hebert Donahue Jr., MD.     IHebert Jr., MD, personally performed the services described in this documentation as scribed by Arelis Parrish  in my presence, and it is both accurate and complete.             Hebert Donahue Jr., MD    7/26/2023

## 2023-07-26 NOTE — ASSESSMENT & PLAN NOTE
He is tolerating his current statin dose.  He will have updated blood work in the near future through his PCP.

## 2023-07-26 NOTE — ASSESSMENT & PLAN NOTE
These are well controlled on his current metoprolol dose.  I made no changes to his medical regimen.

## 2023-08-08 ENCOUNTER — OFFICE VISIT (OUTPATIENT)
Dept: PRIMARY CARE | Facility: CLINIC | Age: 58
End: 2023-08-08
Payer: COMMERCIAL

## 2023-08-08 VITALS
HEART RATE: 88 BPM | OXYGEN SATURATION: 98 % | DIASTOLIC BLOOD PRESSURE: 82 MMHG | SYSTOLIC BLOOD PRESSURE: 122 MMHG | BODY MASS INDEX: 36.97 KG/M2 | WEIGHT: 258.2 LBS | HEIGHT: 70 IN | RESPIRATION RATE: 17 BRPM | TEMPERATURE: 97.4 F

## 2023-08-08 DIAGNOSIS — R73.09 ELEVATED GLUCOSE: ICD-10-CM

## 2023-08-08 DIAGNOSIS — R97.20 ELEVATED PSA: ICD-10-CM

## 2023-08-08 DIAGNOSIS — Z12.11 SCREEN FOR COLON CANCER: ICD-10-CM

## 2023-08-08 DIAGNOSIS — E78.5 HYPERLIPIDEMIA, UNSPECIFIED HYPERLIPIDEMIA TYPE: Primary | ICD-10-CM

## 2023-08-08 PROBLEM — R03.0 ELEVATED BLOOD PRESSURE READING: Status: RESOLVED | Noted: 2021-12-09 | Resolved: 2023-08-08

## 2023-08-08 PROCEDURE — 3008F BODY MASS INDEX DOCD: CPT | Performed by: FAMILY MEDICINE

## 2023-08-08 PROCEDURE — 99214 OFFICE O/P EST MOD 30 MIN: CPT | Performed by: FAMILY MEDICINE

## 2023-08-08 ASSESSMENT — ENCOUNTER SYMPTOMS
COUGH: 0
WEAKNESS: 0
HEMATURIA: 0
NUMBNESS: 0
CONSTIPATION: 0
SORE THROAT: 0
VOMITING: 0
FEVER: 0
NAUSEA: 0
BLOOD IN STOOL: 0
CHILLS: 0
DIZZINESS: 0
POLYPHAGIA: 0
CONFUSION: 0
DIARRHEA: 0
DYSURIA: 0
POLYDIPSIA: 0
ABDOMINAL PAIN: 0
STRIDOR: 0
HEADACHES: 0
WHEEZING: 0
DYSPHORIC MOOD: 0
PALPITATIONS: 0
SHORTNESS OF BREATH: 0

## 2023-08-08 ASSESSMENT — PATIENT HEALTH QUESTIONNAIRE - PHQ9: SUM OF ALL RESPONSES TO PHQ9 QUESTIONS 1 & 2: 0

## 2023-08-08 NOTE — PROGRESS NOTES
"      Subjective      Patient ID: Cesar Cruz is a 58 y.o. male.  1965      [facility]      The following have been reviewed and updated as appropriate in this visit:   Problems       Review of Systems   Constitutional: Negative for chills and fever.   HENT: Negative for dental problem, hearing loss and sore throat.    Eyes: Negative for visual disturbance.   Respiratory: Negative for cough, shortness of breath, wheezing and stridor.    Cardiovascular: Negative for chest pain, palpitations and leg swelling.   Gastrointestinal: Negative for abdominal pain, blood in stool, constipation, diarrhea, nausea and vomiting.   Endocrine: Negative for cold intolerance, heat intolerance, polydipsia, polyphagia and polyuria.   Genitourinary: Negative for dysuria, hematuria, penile discharge, penile pain, penile swelling, scrotal swelling and testicular pain.   Musculoskeletal: Negative for gait problem.   Skin: Negative for rash.   Neurological: Negative for dizziness, syncope, weakness, numbness and headaches.   Psychiatric/Behavioral: Negative for confusion and dysphoric mood.   All other systems reviewed and are negative.      Objective     Vitals:    08/08/23 1454   BP: 122/82   BP Location: Left upper arm   Patient Position: Sitting   Pulse: 88   Resp: 17   Temp: 36.3 °C (97.4 °F)   TempSrc: Temporal   SpO2: 98%   Weight: 117 kg (258 lb 3.2 oz)   Height: 1.765 m (5' 9.5\")     Body mass index is 37.58 kg/m².    Physical Exam  Vitals and nursing note reviewed.   Constitutional:       Appearance: Normal appearance.   Eyes:      General: No scleral icterus.  Cardiovascular:      Rate and Rhythm: Normal rate and regular rhythm.      Heart sounds: Normal heart sounds.   Pulmonary:      Effort: Pulmonary effort is normal.      Breath sounds: Normal breath sounds.   Abdominal:      General: There is no distension.      Palpations: Abdomen is soft. There is no mass.      Tenderness: There is no abdominal tenderness. There is " no guarding or rebound.      Hernia: No hernia is present.   Musculoskeletal:      Right lower leg: No edema.      Left lower leg: No edema.   Skin:     General: Skin is warm and dry.      Findings: No rash.   Neurological:      General: No focal deficit present.      Mental Status: He is alert.   Psychiatric:         Mood and Affect: Mood normal.         Behavior: Behavior normal.         Assessment/Plan   Diagnoses and all orders for this visit:    Hyperlipidemia, unspecified hyperlipidemia type (Primary)  Comments:  lab  Orders:  -     CBC and differential; Future  -     Comprehensive metabolic panel; Future  -     Lipid panel; Future  -     TSH; Future    Elevated glucose  Comments:  lab  Orders:  -     CBC and differential; Future  -     Comprehensive metabolic panel; Future  -     Hemoglobin A1c; Future    Elevated PSA  Comments:  Urology follows    Screen for colon cancer  Comments:  colonosc

## 2023-08-09 ENCOUNTER — APPOINTMENT (OUTPATIENT)
Dept: LAB | Age: 58
End: 2023-08-09
Attending: FAMILY MEDICINE
Payer: COMMERCIAL

## 2023-08-09 DIAGNOSIS — R73.09 ELEVATED GLUCOSE: ICD-10-CM

## 2023-08-09 DIAGNOSIS — E78.5 HYPERLIPIDEMIA, UNSPECIFIED HYPERLIPIDEMIA TYPE: ICD-10-CM

## 2023-08-09 LAB
ALBUMIN SERPL-MCNC: 4 G/DL (ref 3.5–5.7)
ALP SERPL-CCNC: 73 IU/L (ref 34–125)
ALT SERPL-CCNC: 32 IU/L (ref 7–52)
ANION GAP SERPL CALC-SCNC: 6 MEQ/L (ref 3–15)
AST SERPL-CCNC: 27 IU/L (ref 13–39)
BASOPHILS # BLD: 0.06 K/UL (ref 0.01–0.1)
BASOPHILS NFR BLD: 0.8 %
BILIRUB SERPL-MCNC: 0.7 MG/DL (ref 0.3–1.2)
BUN SERPL-MCNC: 19 MG/DL (ref 7–25)
CALCIUM SERPL-MCNC: 8.8 MG/DL (ref 8.6–10.3)
CHLORIDE SERPL-SCNC: 102 MEQ/L (ref 98–107)
CHOLEST SERPL-MCNC: 166 MG/DL
CO2 SERPL-SCNC: 28 MEQ/L (ref 21–31)
CREAT SERPL-MCNC: 1.1 MG/DL (ref 0.7–1.3)
DIFFERENTIAL METHOD BLD: ABNORMAL
EOSINOPHIL # BLD: 0.11 K/UL (ref 0.04–0.54)
EOSINOPHIL NFR BLD: 1.4 %
ERYTHROCYTE [DISTWIDTH] IN BLOOD BY AUTOMATED COUNT: 14.3 % (ref 11.6–14.4)
EST. AVERAGE GLUCOSE BLD GHB EST-MCNC: 117 MG/DL
GFR SERPL CREATININE-BSD FRML MDRD: >60 ML/MIN/1.73M*2
GLUCOSE SERPL-MCNC: 88 MG/DL (ref 70–99)
HBA1C MFR BLD: 5.7 %
HCT VFR BLDCO AUTO: 51.5 % (ref 40.1–51)
HDLC SERPL-MCNC: 45 MG/DL
HDLC SERPL: 3.7 {RATIO}
HGB BLD-MCNC: 16.5 G/DL (ref 13.7–17.5)
IMM GRANULOCYTES # BLD AUTO: 0.03 K/UL (ref 0–0.08)
IMM GRANULOCYTES NFR BLD AUTO: 0.4 %
LDLC SERPL CALC-MCNC: 93 MG/DL
LYMPHOCYTES # BLD: 2.04 K/UL (ref 1.2–3.5)
LYMPHOCYTES NFR BLD: 26 %
MCH RBC QN AUTO: 28.6 PG (ref 28–33.2)
MCHC RBC AUTO-ENTMCNC: 32 G/DL (ref 32.2–36.5)
MCV RBC AUTO: 89.4 FL (ref 83–98)
MONOCYTES # BLD: 0.99 K/UL (ref 0.3–1)
MONOCYTES NFR BLD: 12.6 %
NEUTROPHILS # BLD: 4.62 K/UL (ref 1.7–7)
NEUTS SEG NFR BLD: 58.8 %
NONHDLC SERPL-MCNC: 121 MG/DL
NRBC BLD-RTO: 0 %
PDW BLD AUTO: 11.2 FL (ref 9.4–12.4)
PLATELET # BLD AUTO: 222 K/UL (ref 150–350)
POTASSIUM SERPL-SCNC: 4.7 MEQ/L (ref 3.5–5.1)
PROT SERPL-MCNC: 7.3 G/DL (ref 6–8.2)
RBC # BLD AUTO: 5.76 M/UL (ref 4.5–5.8)
SODIUM SERPL-SCNC: 136 MEQ/L (ref 136–145)
TRIGL SERPL-MCNC: 139 MG/DL
TSH SERPL DL<=0.05 MIU/L-ACNC: 3.2 MIU/L (ref 0.34–5.6)
WBC # BLD AUTO: 7.85 K/UL (ref 3.8–10.5)

## 2023-08-09 PROCEDURE — 80061 LIPID PANEL: CPT

## 2023-08-09 PROCEDURE — 83036 HEMOGLOBIN GLYCOSYLATED A1C: CPT

## 2023-08-09 PROCEDURE — 84443 ASSAY THYROID STIM HORMONE: CPT

## 2023-08-09 PROCEDURE — 36415 COLL VENOUS BLD VENIPUNCTURE: CPT

## 2023-08-09 PROCEDURE — 85025 COMPLETE CBC W/AUTO DIFF WBC: CPT

## 2023-08-09 PROCEDURE — 82040 ASSAY OF SERUM ALBUMIN: CPT

## 2023-11-15 RX ORDER — METOPROLOL SUCCINATE 50 MG/1
TABLET, EXTENDED RELEASE ORAL
Qty: 90 TABLET | Refills: 3 | Status: SHIPPED | OUTPATIENT
Start: 2023-11-15 | End: 2024-10-04

## 2023-11-15 NOTE — TELEPHONE ENCOUNTER
[facility] Refill Request      MA Notes: Labs done 8/9/2023        Nurse Notes:       Last Office Visit: 7/26/2023  Last Telemedicine Visit: Visit date not found    Next Office Visit: Visit date not found  Next Telemedicine Visit: Visit date not found     Most Recent BP Readings:  BP Readings from Last 3 Encounters:   08/08/23 122/82   07/26/23 118/78   07/12/22 122/76       Most recent Lab results:  Lab Results   Component Value Date    CHOL 166 08/09/2023    HDL 45 08/09/2023    TRIG 139 08/09/2023    NONHDLCALC 121 08/09/2023       Lab Results   Component Value Date    AST 27 08/09/2023    ALT 32 08/09/2023       Lab Results   Component Value Date     08/09/2023    K 4.7 08/09/2023    BUN 19 08/09/2023    CREATININE 1.1 08/09/2023       Current Medications:    Current Outpatient Medications:     aspirin 81 mg enteric coated tablet, 81 mg. Every other day, Disp: , Rfl:     famotidine (PEPCID) 20 mg tablet, Take 20 mg by mouth daily. One daily, Disp: , Rfl:     finasteride (PROSCAR) 5 mg tablet, Take 5 mg by mouth daily. Taking every other day , Disp: , Rfl:     lidocaine (LIDODERM) 5 % patch, Apply 1 patch topically daily. Remove & discard patch within 12 hours or as directed by presscriber., Disp: 20 patch, Rfl: 0    metoprolol succinate XL (TOPROL-XL) 50 mg 24 hr tablet, TAKE 1 TABLET BY MOUTH EVERY DAY, Disp: 90 tablet, Rfl: 3    simvastatin (ZOCOR) 10 mg tablet, TAKE 1 TABLET BY MOUTH EVERY DAY AT NIGHT, Disp: 90 tablet, Rfl: 3    tamsulosin (FLOMAX) 0.4 mg capsule, Take 0.4 mg by mouth., Disp: , Rfl:

## 2023-12-27 ENCOUNTER — TELEPHONE (OUTPATIENT)
Dept: PRIMARY CARE | Facility: CLINIC | Age: 58
End: 2023-12-27

## 2023-12-27 RX ORDER — AZITHROMYCIN 250 MG/1
TABLET, FILM COATED ORAL
Qty: 6 TABLET | Refills: 0 | Status: SHIPPED | OUTPATIENT
Start: 2023-12-27 | End: 2024-01-01

## 2023-12-27 NOTE — TELEPHONE ENCOUNTER
Request for Medical Advice   Patient PCP: Emil Coats MD  New or Existing Issue: NEW  Question or Concern:   Pt called in and stated he got sick on 12/22/2023. Pt stated he has yellow and brown mucus and feels he is congested. Pt would like something sent to pharmacy.   Preferred Pharmacy:   [facility]/pharmacy #[id number] - [place], [place] - [address] [facility]  [address]  [place] [address]  Phone: [phone] Fax: [phone]      The practice will reach out to discuss your Medical Question or Concern within 2 business days.

## 2023-12-27 NOTE — TELEPHONE ENCOUNTER
RN called and spoke to pt. Pt has been having symptoms since Friday 12/22, still having cough and wheezing when laying on right side. Pt reports he had fever and diarrhea when symptoms first started, currently no fever. Pt concerned about right side of the lung due to the wheezing. Pt has been taking mucinex, decongestants, coricidin BP. Pt denies difficulty breathing and SOB, but does have occasional chest pain when laying down. RN advised pt to go to  for persisting symptoms and to get Xray done, pt declined. Pt requesting abx sent to his pharmacy.

## 2024-08-01 RX ORDER — SIMVASTATIN 10 MG/1
10 TABLET, FILM COATED ORAL SEE ADMIN INSTRUCTIONS
Qty: 90 TABLET | Refills: 0 | Status: SHIPPED | OUTPATIENT
Start: 2024-08-01 | End: 2024-11-01 | Stop reason: SDUPTHER

## 2024-08-01 NOTE — TELEPHONE ENCOUNTER
Medicine Refill Request    Last Office Visit: 8/8/2023   Last Consult Visit: Visit date not found  Last Telemedicine Visit: Visit date not found    Next Appointment: 9/19/2024      Current Outpatient Medications:     aspirin 81 mg enteric coated tablet, 81 mg. Every other day, Disp: , Rfl:     famotidine (PEPCID) 20 mg tablet, Take 20 mg by mouth daily. One daily, Disp: , Rfl:     finasteride (PROSCAR) 5 mg tablet, Take 5 mg by mouth daily. Taking every other day , Disp: , Rfl:     lidocaine (LIDODERM) 5 % patch, Apply 1 patch topically daily. Remove & discard patch within 12 hours or as directed by presscriber., Disp: 20 patch, Rfl: 0    metoprolol succinate XL (TOPROL-XL) 50 mg 24 hr tablet, TAKE 1 TABLET BY MOUTH EVERY DAY, Disp: 90 tablet, Rfl: 3    simvastatin (ZOCOR) 10 mg tablet, TAKE 1 TABLET BY MOUTH EVERY DAY AT NIGHT, Disp: 90 tablet, Rfl: 3    tamsulosin (FLOMAX) 0.4 mg capsule, Take 0.4 mg by mouth., Disp: , Rfl:       BP Readings from Last 3 Encounters:   08/08/23 122/82   07/26/23 118/78   07/12/22 122/76       Recent Lab results:  Lab Results   Component Value Date    CHOL 166 08/09/2023   ,   Lab Results   Component Value Date    HDL 45 08/09/2023   ,   Lab Results   Component Value Date    LDLCALC 93 08/09/2023   ,   Lab Results   Component Value Date    TRIG 139 08/09/2023        Lab Results   Component Value Date    GLUCOSE 88 08/09/2023   ,   Lab Results   Component Value Date    HGBA1C 5.7 (H) 08/09/2023         Lab Results   Component Value Date    CREATININE 1.1 08/09/2023       Lab Results   Component Value Date    TSH 3.20 08/09/2023           Lab Results   Component Value Date    HGBA1C 5.7 (H) 08/09/2023       Lab Results   Component Value Date    EGFR >60.0 08/09/2023    EGFR 56.9 (L) 11/28/2021    EGFR >60.0 09/14/2021

## 2024-09-17 ENCOUNTER — TELEPHONE (OUTPATIENT)
Dept: PRIMARY CARE | Facility: CLINIC | Age: 59
End: 2024-09-17
Payer: COMMERCIAL

## 2024-10-04 RX ORDER — METOPROLOL SUCCINATE 50 MG/1
TABLET, EXTENDED RELEASE ORAL
Qty: 90 TABLET | Refills: 0 | Status: SHIPPED | OUTPATIENT
Start: 2024-10-04 | End: 2024-12-10 | Stop reason: SDUPTHER

## 2024-10-04 NOTE — TELEPHONE ENCOUNTER
[facility] Refill Request      MA Notes:      Nurse Notes:      Last Office Visit: 7/26/2023  Last Telemedicine Visit: Visit date not found    Next Office Visit: Visit date not found  Next Telemedicine Visit: Visit date not found     Most Recent BP Readings:  BP Readings from Last 3 Encounters:   08/08/23 122/82   07/26/23 118/78   07/12/22 122/76       Most recent Lab results:  Lab Results   Component Value Date    CHOL 166 08/09/2023    HDL 45 08/09/2023    TRIG 139 08/09/2023    NONHDLCALC 121 08/09/2023       Lab Results   Component Value Date    AST 27 08/09/2023    ALT 32 08/09/2023       Lab Results   Component Value Date     08/09/2023    K 4.7 08/09/2023    BUN 19 08/09/2023    CREATININE 1.1 08/09/2023       Current Medications:    Current Outpatient Medications:     aspirin 81 mg enteric coated tablet, 81 mg. Every other day, Disp: , Rfl:     famotidine (PEPCID) 20 mg tablet, Take 20 mg by mouth daily. One daily, Disp: , Rfl:     finasteride (PROSCAR) 5 mg tablet, Take 5 mg by mouth daily. Taking every other day , Disp: , Rfl:     lidocaine (LIDODERM) 5 % patch, Apply 1 patch topically daily. Remove & discard patch within 12 hours or as directed by presscriber., Disp: 20 patch, Rfl: 0    metoprolol succinate XL (TOPROL-XL) 50 mg 24 hr tablet, TAKE 1 TABLET BY MOUTH EVERY DAY, Disp: 90 tablet, Rfl: 3    simvastatin (ZOCOR) 10 mg tablet, Take 1 tablet (10 mg total) by mouth See admin instr. At Night, Disp: 90 tablet, Rfl: 0    tamsulosin (FLOMAX) 0.4 mg capsule, Take 0.4 mg by mouth., Disp: , Rfl:

## 2024-10-30 ENCOUNTER — TELEPHONE (OUTPATIENT)
Dept: PRIMARY CARE | Facility: CLINIC | Age: 59
End: 2024-10-30

## 2024-10-30 RX ORDER — SIMVASTATIN 10 MG/1
10 TABLET, FILM COATED ORAL NIGHTLY
Qty: 30 TABLET | Refills: 0 | OUTPATIENT
Start: 2024-10-30

## 2024-10-30 NOTE — TELEPHONE ENCOUNTER
Medicine Refill Request    Last Office Visit: 8/8/2023   Last Consult Visit: Visit date not found  Last Telemedicine Visit: Visit date not found    Next Appointment: Visit date not found      Current Outpatient Medications:     aspirin 81 mg enteric coated tablet, 81 mg. Every other day, Disp: , Rfl:     famotidine (PEPCID) 20 mg tablet, Take 20 mg by mouth daily. One daily, Disp: , Rfl:     finasteride (PROSCAR) 5 mg tablet, Take 5 mg by mouth daily. Taking every other day , Disp: , Rfl:     lidocaine (LIDODERM) 5 % patch, Apply 1 patch topically daily. Remove & discard patch within 12 hours or as directed by presscriber., Disp: 20 patch, Rfl: 0    metoprolol succinate XL (TOPROL-XL) 50 mg 24 hr tablet, TAKE 1 TABLET BY MOUTH EVERY DAY, Disp: 90 tablet, Rfl: 0    simvastatin (ZOCOR) 10 mg tablet, Take 1 tablet (10 mg total) by mouth See admin instr. At Night, Disp: 90 tablet, Rfl: 0    tamsulosin (FLOMAX) 0.4 mg capsule, Take 0.4 mg by mouth., Disp: , Rfl:       BP Readings from Last 3 Encounters:   08/08/23 122/82   07/26/23 118/78   07/12/22 122/76       Recent Lab results:  Lab Results   Component Value Date    CHOL 166 08/09/2023   ,   Lab Results   Component Value Date    HDL 45 08/09/2023   ,   Lab Results   Component Value Date    LDLCALC 93 08/09/2023   ,   Lab Results   Component Value Date    TRIG 139 08/09/2023        Lab Results   Component Value Date    GLUCOSE 88 08/09/2023   ,   Lab Results   Component Value Date    HGBA1C 5.7 (H) 08/09/2023         Lab Results   Component Value Date    CREATININE 1.1 08/09/2023       Lab Results   Component Value Date    TSH 3.20 08/09/2023           Lab Results   Component Value Date    HGBA1C 5.7 (H) 08/09/2023       Lab Results   Component Value Date    EGFR >60.0 08/09/2023    EGFR 56.9 (L) 11/28/2021    EGFR >60.0 09/14/2021

## 2024-11-01 RX ORDER — SIMVASTATIN 10 MG/1
10 TABLET, FILM COATED ORAL SEE ADMIN INSTRUCTIONS
Qty: 90 TABLET | Refills: 0 | Status: SHIPPED | OUTPATIENT
Start: 2024-11-01 | End: 2025-02-03

## 2024-11-01 NOTE — TELEPHONE ENCOUNTER
Medicine Refill Request    Last Office Visit: 8/8/2023   Last Consult Visit: Visit date not found  Last Telemedicine Visit: Visit date not found    Next Appointment: 12/11/2024      Current Outpatient Medications:     aspirin 81 mg enteric coated tablet, 81 mg. Every other day, Disp: , Rfl:     famotidine (PEPCID) 20 mg tablet, Take 20 mg by mouth daily. One daily, Disp: , Rfl:     finasteride (PROSCAR) 5 mg tablet, Take 5 mg by mouth daily. Taking every other day , Disp: , Rfl:     lidocaine (LIDODERM) 5 % patch, Apply 1 patch topically daily. Remove & discard patch within 12 hours or as directed by presscriber., Disp: 20 patch, Rfl: 0    metoprolol succinate XL (TOPROL-XL) 50 mg 24 hr tablet, TAKE 1 TABLET BY MOUTH EVERY DAY, Disp: 90 tablet, Rfl: 0    simvastatin (ZOCOR) 10 mg tablet, Take 1 tablet (10 mg total) by mouth See admin instr. At Night, Disp: 90 tablet, Rfl: 0    tamsulosin (FLOMAX) 0.4 mg capsule, Take 0.4 mg by mouth., Disp: , Rfl:       BP Readings from Last 3 Encounters:   08/08/23 122/82   07/26/23 118/78   07/12/22 122/76       Recent Lab results:  Lab Results   Component Value Date    CHOL 166 08/09/2023   ,   Lab Results   Component Value Date    HDL 45 08/09/2023   ,   Lab Results   Component Value Date    LDLCALC 93 08/09/2023   ,   Lab Results   Component Value Date    TRIG 139 08/09/2023        Lab Results   Component Value Date    GLUCOSE 88 08/09/2023   ,   Lab Results   Component Value Date    HGBA1C 5.7 (H) 08/09/2023         Lab Results   Component Value Date    CREATININE 1.1 08/09/2023       Lab Results   Component Value Date    TSH 3.20 08/09/2023           Lab Results   Component Value Date    HGBA1C 5.7 (H) 08/09/2023

## 2024-11-01 NOTE — TELEPHONE ENCOUNTER
Medication Request   Patient PCP: Emil Coats MD  Next Office Visit: 12/11/2024  Has this provider prescribed this medication before?: yes     Medication Name: simvastatin (ZOCOR) 10 mg tablet     Medication Dose: 10mg     Medication Frequency: Take 1 tablet (10 mg total) by mouth See admin instr. At Night  Patient haves an appt 12/11/24    Preferred Pharmacy:   [facility]/pharmacy #[id number] - [place], [place] - [address] [place], [place] [address] [place] [address]  [place] [address]  Phone: [phone] Fax: [phone]      Please allow 2 business days for your provider to send your medication request or to reach out to discuss.

## 2024-11-14 ENCOUNTER — TELEPHONE (OUTPATIENT)
Dept: COLORECTAL SURGERY | Facility: CLINIC | Age: 59
End: 2024-11-14

## 2024-11-14 ENCOUNTER — OFFICE VISIT (OUTPATIENT)
Dept: COLORECTAL SURGERY | Facility: CLINIC | Age: 59
End: 2024-11-14
Payer: COMMERCIAL

## 2024-11-14 VITALS
OXYGEN SATURATION: 97 % | SYSTOLIC BLOOD PRESSURE: 134 MMHG | BODY MASS INDEX: 37.33 KG/M2 | HEART RATE: 96 BPM | WEIGHT: 252 LBS | DIASTOLIC BLOOD PRESSURE: 84 MMHG | HEIGHT: 69 IN

## 2024-11-14 DIAGNOSIS — Z86.0100 PERSONAL HISTORY OF COLON POLYPS, UNSPECIFIED: ICD-10-CM

## 2024-11-14 DIAGNOSIS — Z12.11 ENCOUNTER FOR SCREENING FOR MALIGNANT NEOPLASM OF COLON: Primary | ICD-10-CM

## 2024-11-14 PROCEDURE — 99203 OFFICE O/P NEW LOW 30 MIN: CPT | Performed by: COLON & RECTAL SURGERY

## 2024-11-14 PROCEDURE — 3008F BODY MASS INDEX DOCD: CPT | Performed by: COLON & RECTAL SURGERY

## 2024-11-14 RX ORDER — SODIUM PICOSULFATE, MAGNESIUM OXIDE, AND ANHYDROUS CITRIC ACID 12; 3.5; 1 G/175ML; G/175ML; MG/175ML
5 LIQUID ORAL DAILY
Qty: 175 ML | Refills: 0 | Status: SHIPPED | OUTPATIENT
Start: 2024-11-14 | End: 2024-11-16

## 2024-11-14 NOTE — PROGRESS NOTES
Referring Provider: No ref. provider found     Cesar Cruz is a 59 y.o. male is present in the office today for A routine screening Colonoscopy.         Past Medical History:   Diagnosis Date    Back pain     BPH (benign prostatic hyperplasia)     Lipid disorder     Lyme disease 11/6/2015    Sinus tachycardia 11/28/2021    Evaluated in ED on 11/28/21 for palpitations, unexplained tachycardia.       Past Surgical History   Procedure Laterality Date    Colonoscopy  01/06/2017    Epidural block injection      lumbar    Liver biopsy  1984    Vasectomy      [place] tooth extraction          Social History     Socioeconomic History    Marital status:      Spouse name: None    Number of children: None    Years of education: None    Highest education level: None   Occupational History    Occupation: retired , now owns own company doing security consulting   Tobacco Use    Smoking status: Never    Smokeless tobacco: Never   Vaping Use    Vaping status: Never Used   Substance and Sexual Activity    Alcohol use: Not Currently     Comment: only on perez gold    Drug use: Never    Sexual activity: Yes     Partners: Female     Birth control/protection: None       Family History   Problem Relation Name Age of Onset    Diabetes Biological Mother      Other Biological Mother          fatty liver    Breast cancer Biological Mother          twice    Multiple myeloma Biological Father      Hypertension Biological Father          mild    Obesity Biological Sister      Thyroid cancer Biological Brother      No Known Problems Biological Brother      No Known Problems Biological Sister      No Known Problems Biological Son      No Known Problems Biological Son      No Known Problems Biological Daughter         Ciprofloxacin    Current Outpatient Medications   Medication Instructions    aspirin 81 mg    famotidine (PEPCID) 20 mg, Daily    finasteride (PROSCAR) 5 mg, Daily    lidocaine (LIDODERM) 5 % patch 1  patch, Topical, Daily, Remove & discard patch within 12 hours or as directed by presscriber.    metoprolol succinate XL (TOPROL-XL) 50 mg 24 hr tablet TAKE 1 TABLET BY MOUTH EVERY DAY    simvastatin (ZOCOR) 10 mg, oral, See admin instructions, At Night    tamsulosin (FLOMAX) 0.4 mg           Body mass index is 37.21 kg/m².    Physical Exam     Assessment      The patient presents today for routine screening colonoscopy.  The indication for the procedure as well as the risks and benefits of the procedure were discussed with the patient at length.  The risks including bleeding, perforation and organ injury were discussed.  The preparation was also briefly discussed and all questions were answered.  We will schedule the patient at their convenience.      No orders of the defined types were placed in this encounter.      No follow-ups on file.     Ike Rubio MD  11/14/2024

## 2024-11-21 NOTE — PROGRESS NOTES
Cardiology  Office Progress Note         Reason for visit:   Chief Complaint   Patient presents with    Follow-up       HPI     Cesar Cruz is a 59 y.o. male who presents to the office for cardiovascular follow up and management of dyslipidemia and palpitations.    He was last seen in the office on 7/26/23.  At that visit I made no changes to his medical regimen and asked him to follow up in 1 year.     He denies any chest pain, shortness of breath, edema, palpitations, near syncope or syncope.     He will be having yearly labs with his PCP in the near future.     FLP 8/9/23:  , , HDL 45, LDL 93      Past Medical History:   Diagnosis Date    Back pain     BPH (benign prostatic hyperplasia)     Lipid disorder     Lyme disease 11/6/2015    Sinus tachycardia 11/28/2021    Evaluated in ED on 11/28/21 for palpitations, unexplained tachycardia.       Past Surgical History   Procedure Laterality Date    Colonoscopy  01/06/2017    Epidural block injection      lumbar    Liver biopsy  1984    Vasectomy      [place] tooth extraction         Social History     Tobacco Use    Smoking status: Never    Smokeless tobacco: Never   Vaping Use    Vaping status: Never Used   Substance Use Topics    Alcohol use: Not Currently     Comment: only on perez gold    Drug use: Never       Family History   Problem Relation Name Age of Onset    Diabetes Biological Mother      Other Biological Mother          fatty liver    Breast cancer Biological Mother          twice    Multiple myeloma Biological Father      Hypertension Biological Father          mild    Obesity Biological Sister      Thyroid cancer Biological Brother      No Known Problems Biological Brother      No Known Problems Biological Sister      No Known Problems Biological Son      No Known Problems Biological Son      No Known Problems Biological Daughter         Allergies:  Ciprofloxacin    Current Outpatient Medications   Medication Sig Dispense Refill     aspirin 81 mg enteric coated tablet 81 mg. Every other day      famotidine (PEPCID) 20 mg tablet Take 20 mg by mouth daily. One daily      finasteride (PROSCAR) 5 mg tablet Take 5 mg by mouth daily. Taking every other day       lidocaine (LIDODERM) 5 % patch Apply 1 patch topically daily. Remove & discard patch within 12 hours or as directed by presscriber. 20 patch 0    metoprolol succinate XL (TOPROL-XL) 50 mg 24 hr tablet Take 1 tablet (50 mg total) by mouth daily. 90 tablet 3    simvastatin (ZOCOR) 10 mg tablet Take 1 tablet (10 mg total) by mouth See admin instr. At Night 90 tablet 0    tadalafiL (CIALIS) 20 mg tablet Take 20 mg by mouth daily as needed for erectile dysfunction.      tamsulosin (FLOMAX) 0.4 mg capsule Take 0.4 mg by mouth.       No current facility-administered medications for this visit.       Review of Systems   Constitutional: Negative for malaise/fatigue.   Cardiovascular:  Negative for chest pain, dyspnea on exertion, irregular heartbeat, leg swelling, near-syncope, orthopnea, palpitations and syncope.   Hematologic/Lymphatic: Does not bruise/bleed easily.   Neurological:  Positive for light-headedness (w/quick change in position). Negative for dizziness.   All other systems reviewed and are negative.      Objective     Vitals:    12/10/24 1000   BP: 118/74   Pulse: 100   SpO2: 97%       Wt Readings from Last 5 Encounters:   12/10/24 115 kg (254 lb)   11/14/24 114 kg (252 lb)   08/08/23 117 kg (258 lb 3.2 oz)   07/26/23 116 kg (255 lb 3.2 oz)   07/12/22 114 kg (252 lb)       Body mass index is 36.45 kg/m².    Physical Exam  Constitutional:       General: He is not in acute distress.     Appearance: Normal appearance.   HENT:      Head: Normocephalic.   Eyes:      Extraocular Movements: Extraocular movements intact.   Neck:      Vascular: No JVD.   Cardiovascular:      Rate and Rhythm: Normal rate and regular rhythm.      Heart sounds: Normal heart sounds. No murmur heard.  Pulmonary:       Breath sounds: Normal breath sounds. No wheezing, rhonchi or rales.   Abdominal:      Palpations: Abdomen is soft.   Musculoskeletal:         General: No swelling.   Skin:     General: Skin is warm and dry.   Neurological:      Mental Status: He is alert.   Psychiatric:         Mood and Affect: Mood normal.          ECG   sinus rhythm    Hematology  Lab Results   Component Value Date    WBC 7.85 08/09/2023    HGB 16.5 08/09/2023    HCT 51.5 (H) 08/09/2023     08/09/2023    INR 1.1 11/28/2021       Chemistries  Lab Results   Component Value Date     08/09/2023    K 4.7 08/09/2023     08/09/2023    CREATININE 1.1 08/09/2023    BUN 19 08/09/2023    CO2 28 08/09/2023    GLUCOSE 88 08/09/2023    CALCIUM 8.8 08/09/2023    ALT 32 08/09/2023    AST 27 08/09/2023       Cholesterol  Lab Results   Component Value Date    CHOL 166 08/09/2023    TRIG 139 08/09/2023    HDL 45 08/09/2023    LDLCALC 93 08/09/2023    NONHDLCALC 121 08/09/2023       Endocrine  Lab Results   Component Value Date    TSH 3.20 08/09/2023    HGBA1C 5.7 (H) 08/09/2023       Cardiac Imaging    TRANSTHORACIC ECHO (TTE) COMPLETE 12/13/2021    Interpretation Summary  · Tricuspid valve structure is normal. No tricuspid valve regurgitation. Jet is insufficient to calculate pulmonary artery pressure.  · Normal-sized LV. Mild concentric left ventricular hypertrophy. Preserved LV systolic function. Estimated EF 60%. No regional wall motion abnormalities.  · Trace mitral valve regurgitation.  · Normal-sized RV. Normal RV systolic function.        Assessment/Plan     Palpitations  These are well controlled on his current metoprolol dose.  I made no changes to his medical regimen.    Hyperlipidemia  His lipids are adequately controlled.  I have made no changes to this aspect of his medical regimen. In addition to his medications, he should continue to keep a low fat, heart healthy diet to help maintain adequate cholesterol control.        Orders  Placed This Encounter   Procedures    [facility] MUSE ECG 12 lead (clinic performed)     Scheduling Instructions:      PLEASE USE THIS ORDER FOR ECG'S PERFORMED IN PHYSICIAN OFFICES     Order Specific Question:   Release to patient     Answer:   Immediate [1]       Follow Up Plans:  Return in about 1 year (around 12/10/2025).          I, Rebecca Kramer, am scribing for, and in the presence of, Hebert Donahue Jr., MD.    I, Hebert Donahue Jr., MD, personally performed the services described in this documentation as scribed by Rebecca Kramer in my presence, and it is both accurate and complete.       Hebert Donahue Jr., MD  12/10/2024

## 2024-12-10 ENCOUNTER — OFFICE VISIT (OUTPATIENT)
Dept: CARDIOLOGY | Facility: CLINIC | Age: 59
End: 2024-12-10
Payer: COMMERCIAL

## 2024-12-10 VITALS
OXYGEN SATURATION: 97 % | SYSTOLIC BLOOD PRESSURE: 118 MMHG | WEIGHT: 254 LBS | DIASTOLIC BLOOD PRESSURE: 74 MMHG | BODY MASS INDEX: 36.36 KG/M2 | HEART RATE: 100 BPM | HEIGHT: 70 IN

## 2024-12-10 DIAGNOSIS — R00.2 PALPITATIONS: ICD-10-CM

## 2024-12-10 DIAGNOSIS — E78.2 MIXED HYPERLIPIDEMIA: Primary | ICD-10-CM

## 2024-12-10 PROBLEM — Z12.11 ENCOUNTER FOR SCREENING FOR MALIGNANT NEOPLASM OF COLON: Status: RESOLVED | Noted: 2024-11-14 | Resolved: 2024-12-10

## 2024-12-10 LAB
ATRIAL RATE: 92
P AXIS: 27
PR INTERVAL: 154
QRS DURATION: 80
QT INTERVAL: 348
QTC CALCULATION(BAZETT): 430
R AXIS: 64
T WAVE AXIS: 18
VENTRICULAR RATE: 92

## 2024-12-10 PROCEDURE — 93000 ELECTROCARDIOGRAM COMPLETE: CPT | Performed by: INTERNAL MEDICINE

## 2024-12-10 PROCEDURE — 3008F BODY MASS INDEX DOCD: CPT | Performed by: INTERNAL MEDICINE

## 2024-12-10 PROCEDURE — 99214 OFFICE O/P EST MOD 30 MIN: CPT | Performed by: INTERNAL MEDICINE

## 2024-12-10 RX ORDER — METOPROLOL SUCCINATE 50 MG/1
50 TABLET, EXTENDED RELEASE ORAL DAILY
Qty: 90 TABLET | Refills: 3 | Status: SHIPPED | OUTPATIENT
Start: 2024-12-10 | End: 2025-12-10

## 2024-12-10 RX ORDER — TADALAFIL 20 MG/1
20 TABLET ORAL DAILY PRN
COMMUNITY

## 2024-12-10 ASSESSMENT — ENCOUNTER SYMPTOMS
DIZZINESS: 0
NEAR-SYNCOPE: 0
LIGHT-HEADEDNESS: 1
SYNCOPE: 0
BRUISES/BLEEDS EASILY: 0
ORTHOPNEA: 0
PALPITATIONS: 0
IRREGULAR HEARTBEAT: 0
DYSPNEA ON EXERTION: 0

## 2024-12-10 NOTE — ASSESSMENT & PLAN NOTE
His lipids are adequately controlled.  I have made no changes to this aspect of his medical regimen. In addition to his medications, he should continue to keep a low fat, heart healthy diet to help maintain adequate cholesterol control.

## 2024-12-11 ENCOUNTER — OFFICE VISIT (OUTPATIENT)
Dept: PRIMARY CARE | Facility: CLINIC | Age: 59
End: 2024-12-11
Payer: COMMERCIAL

## 2024-12-11 VITALS
TEMPERATURE: 97.6 F | SYSTOLIC BLOOD PRESSURE: 126 MMHG | DIASTOLIC BLOOD PRESSURE: 72 MMHG | OXYGEN SATURATION: 98 % | HEART RATE: 89 BPM | RESPIRATION RATE: 16 BRPM | WEIGHT: 253 LBS | HEIGHT: 70 IN | BODY MASS INDEX: 36.22 KG/M2

## 2024-12-11 DIAGNOSIS — Z12.11 SCREEN FOR COLON CANCER: ICD-10-CM

## 2024-12-11 DIAGNOSIS — Z00.00 ENCOUNTER FOR GENERAL ADULT MEDICAL EXAMINATION WITHOUT ABNORMAL FINDINGS: Primary | ICD-10-CM

## 2024-12-11 DIAGNOSIS — R73.09 ELEVATED GLUCOSE: ICD-10-CM

## 2024-12-11 DIAGNOSIS — E78.2 MIXED HYPERLIPIDEMIA: ICD-10-CM

## 2024-12-11 PROCEDURE — 3008F BODY MASS INDEX DOCD: CPT | Performed by: FAMILY MEDICINE

## 2024-12-11 PROCEDURE — 99396 PREV VISIT EST AGE 40-64: CPT | Mod: 25 | Performed by: FAMILY MEDICINE

## 2024-12-11 PROCEDURE — 90471 IMMUNIZATION ADMIN: CPT | Performed by: FAMILY MEDICINE

## 2024-12-11 PROCEDURE — 90656 IIV3 VACC NO PRSV 0.5 ML IM: CPT | Performed by: FAMILY MEDICINE

## 2024-12-11 ASSESSMENT — ENCOUNTER SYMPTOMS
POLYDIPSIA: 0
WHEEZING: 0
PALPITATIONS: 0
HEADACHES: 0
ABDOMINAL PAIN: 0
DIZZINESS: 0
CONSTIPATION: 0
SORE THROAT: 0
POLYPHAGIA: 0
CONFUSION: 0
FEVER: 0
STRIDOR: 0
DYSURIA: 0
HEMATURIA: 0
DIARRHEA: 0
SHORTNESS OF BREATH: 0
CHILLS: 0
COUGH: 0
VOMITING: 0
BLOOD IN STOOL: 0
DYSPHORIC MOOD: 0
NUMBNESS: 0
NAUSEA: 0
WEAKNESS: 0

## 2024-12-11 ASSESSMENT — PATIENT HEALTH QUESTIONNAIRE - PHQ9: SUM OF ALL RESPONSES TO PHQ9 QUESTIONS 1 & 2: 0

## 2024-12-11 NOTE — ASSESSMENT & PLAN NOTE
Orders:    CBC and differential; Future    Comprehensive metabolic panel; Future    Hemoglobin A1c; Future

## 2024-12-11 NOTE — PROGRESS NOTES
"Subjective      Patient ID: Cesar Cruz is a 59 y.o. male.  1965      epp        The following have been reviewed and updated as appropriate in this visit:   Problems       Review of Systems   Constitutional:  Negative for chills and fever.   HENT:  Negative for dental problem, hearing loss and sore throat.    Eyes:  Negative for visual disturbance.   Respiratory:  Negative for cough, shortness of breath, wheezing and stridor.    Cardiovascular:  Negative for chest pain, palpitations and leg swelling.   Gastrointestinal:  Negative for abdominal pain, blood in stool, constipation, diarrhea, nausea and vomiting.   Endocrine: Negative for cold intolerance, heat intolerance, polydipsia, polyphagia and polyuria.   Genitourinary:  Negative for dysuria, hematuria, penile discharge, penile pain, penile swelling, scrotal swelling and testicular pain.   Musculoskeletal:  Negative for gait problem.   Skin:  Negative for rash.   Neurological:  Negative for dizziness, syncope, weakness, numbness and headaches.   Psychiatric/Behavioral:  Negative for confusion and dysphoric mood.    All other systems reviewed and are negative.      Objective     Vitals:    12/11/24 1134   BP: 126/72   BP Location: Left upper arm   Patient Position: Sitting   Pulse: 89   Resp: 16   Temp: 36.4 °C (97.6 °F)   TempSrc: Temporal   SpO2: 98%   Weight: 115 kg (253 lb)   Height: 1.778 m (5' 10\")     Body mass index is 36.3 kg/m².    Physical Exam  Vitals and nursing note reviewed.   Constitutional:       Appearance: Normal appearance. He is well-developed.   HENT:      Head: Normocephalic and atraumatic.      Right Ear: Tympanic membrane, ear canal and external ear normal.      Left Ear: Tympanic membrane, ear canal and external ear normal.      Nose: Nose normal.      Mouth/Throat:      Mouth: Mucous membranes are moist.      Pharynx: Oropharynx is clear.   Eyes:      General: No scleral icterus.     Extraocular Movements: Extraocular " movements intact.      Conjunctiva/sclera: Conjunctivae normal.      Pupils: Pupils are equal, round, and reactive to light.   Neck:      Thyroid: No thyromegaly.      Vascular: No JVD.      Trachea: No tracheal deviation.   Cardiovascular:      Rate and Rhythm: Normal rate and regular rhythm.      Heart sounds: Normal heart sounds.   Pulmonary:      Effort: Pulmonary effort is normal. No respiratory distress.      Breath sounds: Normal breath sounds. No stridor.   Abdominal:      General: Bowel sounds are normal. There is no distension.      Palpations: Abdomen is soft. There is no mass.      Tenderness: There is no abdominal tenderness. There is no guarding or rebound.      Hernia: No hernia is present.   Musculoskeletal:         General: Normal range of motion.      Cervical back: Normal range of motion and neck supple.      Right lower leg: No edema.      Left lower leg: No edema.   Lymphadenopathy:      Cervical: No cervical adenopathy.   Skin:     General: Skin is warm and dry.      Findings: No rash.   Neurological:      General: No focal deficit present.      Mental Status: He is alert and oriented to person, place, and time.   Psychiatric:         Mood and Affect: Mood normal.         Behavior: Behavior normal.         Assessment & Plan  Encounter for general adult medical examination without abnormal findings  fluvax       Mixed hyperlipidemia    Orders:    CBC and differential; Future    Comprehensive metabolic panel; Future    TSH; Future    Lipid panel; Future    Elevated glucose    Orders:    CBC and differential; Future    Comprehensive metabolic panel; Future    Hemoglobin A1c; Future    Screen for colon cancer  Colonosc planned

## 2024-12-11 NOTE — ASSESSMENT & PLAN NOTE
Orders:    CBC and differential; Future    Comprehensive metabolic panel; Future    TSH; Future    Lipid panel; Future

## 2024-12-12 ENCOUNTER — APPOINTMENT (OUTPATIENT)
Dept: LAB | Age: 59
End: 2024-12-12
Attending: FAMILY MEDICINE
Payer: COMMERCIAL

## 2024-12-12 DIAGNOSIS — R73.09 ELEVATED GLUCOSE: ICD-10-CM

## 2024-12-12 DIAGNOSIS — E78.2 MIXED HYPERLIPIDEMIA: ICD-10-CM

## 2024-12-12 LAB
ALBUMIN SERPL-MCNC: 4.1 G/DL (ref 3.5–5.7)
ALP SERPL-CCNC: 74 IU/L (ref 34–125)
ALT SERPL-CCNC: 38 IU/L (ref 7–52)
ANION GAP SERPL CALC-SCNC: 6 MEQ/L (ref 3–15)
AST SERPL-CCNC: 29 IU/L (ref 13–39)
BASOPHILS # BLD: 0.04 K/UL (ref 0.01–0.1)
BASOPHILS NFR BLD: 0.5 %
BILIRUB SERPL-MCNC: 0.6 MG/DL (ref 0.3–1.2)
BUN SERPL-MCNC: 17 MG/DL (ref 7–25)
CALCIUM SERPL-MCNC: 9.4 MG/DL (ref 8.6–10.3)
CHLORIDE SERPL-SCNC: 101 MEQ/L (ref 98–107)
CHOLEST SERPL-MCNC: 169 MG/DL
CO2 SERPL-SCNC: 28 MEQ/L (ref 21–31)
CREAT SERPL-MCNC: 1.2 MG/DL (ref 0.7–1.3)
DIFFERENTIAL METHOD BLD: ABNORMAL
EGFRCR SERPLBLD CKD-EPI 2021: >60 ML/MIN/1.73M*2
EOSINOPHIL # BLD: 0.14 K/UL (ref 0.04–0.54)
EOSINOPHIL NFR BLD: 1.7 %
ERYTHROCYTE [DISTWIDTH] IN BLOOD BY AUTOMATED COUNT: 14.5 % (ref 11.6–14.4)
EST. AVERAGE GLUCOSE BLD GHB EST-MCNC: 123 MG/DL
GLUCOSE SERPL-MCNC: 107 MG/DL (ref 70–99)
HBA1C MFR BLD: 5.9 %
HCT VFR BLD AUTO: 52.5 % (ref 40.1–51)
HDLC SERPL-MCNC: 48 MG/DL
HDLC SERPL: 3.5 {RATIO}
HGB BLD-MCNC: 17 G/DL (ref 13.7–17.5)
IMM GRANULOCYTES # BLD AUTO: 0.04 K/UL (ref 0–0.08)
IMM GRANULOCYTES NFR BLD AUTO: 0.5 %
LDLC SERPL CALC-MCNC: 92 MG/DL
LYMPHOCYTES # BLD: 2.13 K/UL (ref 1.2–3.5)
LYMPHOCYTES NFR BLD: 26.6 %
MCH RBC QN AUTO: 29 PG (ref 28–33.2)
MCHC RBC AUTO-ENTMCNC: 32.4 G/DL (ref 32.2–36.5)
MCV RBC AUTO: 89.6 FL (ref 83–98)
MONOCYTES # BLD: 0.97 K/UL (ref 0.3–1)
MONOCYTES NFR BLD: 12.1 %
NEUTROPHILS # BLD: 4.69 K/UL (ref 1.7–7)
NEUTS SEG NFR BLD: 58.6 %
NONHDLC SERPL-MCNC: 121 MG/DL
NRBC BLD-RTO: 0 %
PLATELET # BLD AUTO: 240 K/UL (ref 150–350)
PMV BLD AUTO: 10.4 FL (ref 9.4–12.4)
POTASSIUM SERPL-SCNC: 4.4 MEQ/L (ref 3.5–5.1)
PROT SERPL-MCNC: 7.4 G/DL (ref 6–8.2)
RBC # BLD AUTO: 5.86 M/UL (ref 4.5–5.8)
SODIUM SERPL-SCNC: 135 MEQ/L (ref 136–145)
TRIGL SERPL-MCNC: 144 MG/DL
TSH SERPL DL<=0.05 MIU/L-ACNC: 3.34 MIU/L (ref 0.34–5.6)
WBC # BLD AUTO: 8.01 K/UL (ref 3.8–10.5)

## 2024-12-12 PROCEDURE — 36415 COLL VENOUS BLD VENIPUNCTURE: CPT

## 2024-12-12 PROCEDURE — 83036 HEMOGLOBIN GLYCOSYLATED A1C: CPT

## 2024-12-12 PROCEDURE — 80053 COMPREHEN METABOLIC PANEL: CPT

## 2024-12-12 PROCEDURE — 85025 COMPLETE CBC W/AUTO DIFF WBC: CPT

## 2024-12-12 PROCEDURE — 80061 LIPID PANEL: CPT

## 2024-12-12 PROCEDURE — 84443 ASSAY THYROID STIM HORMONE: CPT

## 2025-01-15 PROCEDURE — 45385 COLONOSCOPY W/LESION REMOVAL: CPT | Performed by: COLON & RECTAL SURGERY

## 2025-02-03 RX ORDER — SIMVASTATIN 10 MG/1
TABLET, FILM COATED ORAL
Qty: 90 TABLET | Refills: 1 | Status: SHIPPED | OUTPATIENT
Start: 2025-02-03

## 2025-02-03 NOTE — TELEPHONE ENCOUNTER
Medicine Refill Request    Last Office Visit: 12/11/2024   Last Consult Visit: Visit date not found  Last Telemedicine Visit: Visit date not found    Next Appointment: Visit date not found      Current Outpatient Medications:     aspirin 81 mg enteric coated tablet, 81 mg. Every other day, Disp: , Rfl:     famotidine (PEPCID) 20 mg tablet, Take 20 mg by mouth daily. One daily, Disp: , Rfl:     finasteride (PROSCAR) 5 mg tablet, Take 5 mg by mouth daily. Taking every other day , Disp: , Rfl:     lidocaine (LIDODERM) 5 % patch, Apply 1 patch topically daily. Remove & discard patch within 12 hours or as directed by presscriber., Disp: 20 patch, Rfl: 0    metoprolol succinate XL (TOPROL-XL) 50 mg 24 hr tablet, Take 1 tablet (50 mg total) by mouth daily., Disp: 90 tablet, Rfl: 3    simvastatin (ZOCOR) 10 mg tablet, Take 1 tablet (10 mg total) by mouth See admin instr. At Night, Disp: 90 tablet, Rfl: 0    tadalafiL (CIALIS) 20 mg tablet, Take 20 mg by mouth daily as needed for erectile dysfunction., Disp: , Rfl:     tamsulosin (FLOMAX) 0.4 mg capsule, Take 0.4 mg by mouth., Disp: , Rfl:       BP Readings from Last 3 Encounters:   12/11/24 126/72   12/10/24 118/74   11/14/24 134/84       Recent Lab results:  Lab Results   Component Value Date    CHOL 169 12/12/2024   ,   Lab Results   Component Value Date    HDL 48 12/12/2024   ,   Lab Results   Component Value Date    LDLCALC 92 12/12/2024   ,   Lab Results   Component Value Date    TRIG 144 12/12/2024        Lab Results   Component Value Date    GLUCOSE 107 (H) 12/12/2024   ,   Lab Results   Component Value Date    HGBA1C 5.9 (H) 12/12/2024         Lab Results   Component Value Date    CREATININE 1.2 12/12/2024       Lab Results   Component Value Date    TSH 3.34 12/12/2024           Lab Results   Component Value Date    HGBA1C 5.9 (H) 12/12/2024       Lab Results   Component Value Date    EGFR >60.0 12/12/2024    EGFR >60.0 08/09/2023    EGFR 56.9 (L) 11/28/2021

## 2025-02-21 ENCOUNTER — TELEPHONE (OUTPATIENT)
Dept: PRIMARY CARE | Facility: CLINIC | Age: 60
End: 2025-02-21
Payer: COMMERCIAL

## 2025-02-21 NOTE — TELEPHONE ENCOUNTER
Form dropped off for Tier Exception Request for Wegov[employer].  Copy with me, original in Dr. Coats's RX bin.

## 2025-07-16 NOTE — ASSESSMENT & PLAN NOTE
Anesthesia Pre Eval Note    Anesthesia ROS/Med Hx          Pulmonary Review:    Positive for sleep apnea     Cardiovascular Review:     Positive for CAD  Positive for hypertension  Positive for hyperlipidemia  Positive for DVT/PE    GI/HEPATIC/RENAL Review:     Positive for hepatitis Positive for liver disease   Positive for renal disease    End/Other Review:  Positive for diabetes - type 2  Positive for obesity class III - 40.00 - 49.99  Positive for hypothyroidism  Positive for arthritis  Additional Results:      ALLERGIES:  No Known Allergies   Last Labs        Component                Value               Date/Time                  WBC                      20-40 (A)           01/09/2025 0858            RBC                      NONE SEEN           01/09/2025 0858            MCV                      102.9 (H)           06/26/2025 0841            MCH                      31.9                06/26/2025 0841            MCHC                     31.0 (L)            06/26/2025 0841            SODIUM                   143                 06/26/2025 0841            POTASSIUM                4.3                 06/26/2025 0841            CHLORIDE                 105                 06/26/2025 0841            GLUCOSE                  118 (H)             06/26/2025 0841            CREATININE               1.09 (H)            06/26/2025 0841            CALCIUM                  9.3                 06/26/2025 0841        Past Medical History:  No date: Arthritis  No date: Diabetes mellitus  (CMD)  No date: DVT (deep venous thrombosis)  (CMD)      Comment:  left leg 2010  No date: DVT (deep venous thrombosis)  (CMD)  No date: Essential (primary) hypertension      Comment:  positional tachycardia  No date: Hyperlipidemia  No date: Obesity  09/27/2023: Osteoarthritis, localized, knee      Comment:  xr knee eliu 2v:Mild osseous demineralization. No acute                fracture or dislocation. Severe medial tibiofemoral and             His blood pressure readings in the outpatient setting were reasonably well controlled.  As above, we will continue his metoprolol but switch it to the long-acting formulation.       patellofemoral osteoarthrosis bilaterally. Mild changes                at the lateral tibiofemoral compartment.(2020/09/10)  s/p               steroid inj per dr Cervantes, insurance declined gel inj,                s/p PT  S/P Rt TKA 10/11/23. Lt TKR (12/14/23)  02/15/2024: Presence of IVC filter      Comment:  10/23, placed prior to BL TKR, removed 7/24 after neg                BLE dopplers    No date: Shortness of breath  No date: Sleep apnea      Comment:  NO CPAP  Past Surgical History:  07/29/2024: Breast biopsy; Right  No date: Gallbladder surgery  No date: Ivc filter placement      Comment:  removed  10/21/2020: Laparoscopic cholecystectomy  No date: Tongue surgery  12/14/2023: Total knee arthroplasty; Left  10/11/2023: Total knee replacement; Bilateral   Prior to Admission medications :  Medication atorvastatin (LIPITOR) 20 MG tablet, Sig Take 1 tablet by mouth daily. Indications: High Amount of Fats in the Blood, Start Date 7/16/25, End Date , Taking? Yes, Authorizing Provider Anil Millan MD    Medication levothyroxine 25 MCG tablet, Sig Take 1 tablet by mouth daily., Start Date 7/10/25, End Date , Taking? Yes, Authorizing Provider Annabella Pozo CNP    Medication fenofibrate (TRICOR) 145 MG tablet, Sig TAKE 1 TABLET BY MOUTH DAILY  WITH FOOD, Start Date 7/6/25, End Date , Taking? Yes, Authorizing Provider Annabella Pozo CNP    Medication aspirin 325 MG tablet, Sig Take 325 mg by mouth daily., Start Date , End Date , Taking? Yes, Authorizing Provider Provider, Outside    Medication metFORMIN (GLUCOPHAGE-XR) 500 MG 24 hr tablet, Sig TAKE 1 TABLET BY MOUTH DAILY WITH BREAKFAST, Start Date 5/21/25, End Date , Taking? Yes, Authorizing Provider Val Hopson MD    Medication furosemide (LASIX) 20 MG tablet, Sig TAKE 1 TABLET BY MOUTH ONCE  DAILY, Start Date 4/22/25, End Date , Taking? Yes, Authorizing Provider Val Hopson MD    Medication allopurinol (ZYLOPRIM) 300 MG tablet, Sig TAKE 1  TABLET BY MOUTH ONCE  DAILY, Start Date 12/3/24, End Date , Taking? Yes, Authorizing Provider Dannie Ivy NP    Medication metoPROLOL succinate (TOPROL-XL) 50 MG 24 hr tablet, Sig TAKE 1 TABLET BY MOUTH ONCE  DAILY, Start Date 10/24/24, End Date , Taking? Yes, Authorizing Provider Dannie Ivy NP    Medication atorvastatin (LIPITOR) 10 MG tablet, Sig TAKE 1 TABLET BY MOUTH ONCE  DAILY, Start Date 9/8/24, End Date 7/16/25, Taking? Yes, Authorizing Provider Val Hopson MD    Medication OneTouch Ultra test strip, Sig 2 times daily. Indications: Blood glucose monitoring Test blood sugar 2 times daily. Diagnosis: Diabetes Mellitus. Meter: One Touch Ultra, Start Date 6/18/24, End Date , Taking? Yes, Authorizing Provider Val Hopson MD    Medication Lancets 28G Misc, Sig 1 each  in the morning and 1 each in the evening., Start Date 6/18/24, End Date , Taking? Yes, Authorizing Provider Val Hopson MD    Medication VITAMIN D, CHOLECALCIFEROL, PO, Sig Take 2,000 Units by mouth daily., Start Date , End Date , Taking? Yes, Authorizing Provider Provider, Outside    Medication Cyanocobalamin (VITAMIN B-12 PO), Sig Take 5,000 mcg by mouth every 7 days. Sundays, Start Date , End Date , Taking? Yes, Authorizing Provider Provider, Outside    Medication tirzepatide (Mounjaro) 10 MG/0.5ML Solution Auto-injector, Sig Inject 10 mg into the skin every 7 days. Indications: Type 2 Diabetes, Start Date 7/3/25, End Date , Taking? , Authorizing Provider Val Hopson MD        Social history reviewed:  Social History     Tobacco Use   Smoking Status Former    Types: Cigarettes   Smokeless Tobacco Never   Tobacco Comments    quit 26 years ago         E-Cigarette/Vaping Substances & Devices    E-Cigarette/Vaping Use Never Used     Nicotine No     THC No     CBD No     Flavoring No     Disposable No     Pre-filled or Refillable Cartridge No     Refillable Tank No     Pre-filled Pod No        Social History     Substance and Sexual  Activity   Alcohol Use Yes    Comment: rarely, socially            Relevant Problems   Anesthesia Problems   (+) SIRENA (obstructive sleep apnea)       Physical Exam     Airway   Mallampati: III  TM Distance: >3 FB  Neck ROM: Full    Cardiovascular    Cardio Rhythm: Irregular  Cardio Rate: Normal    Dental Exam    Patient has:  Denied broken/chipped/loose teeth and Poor Dentition    Legend: C=Chipped  M=Missing  L=Loose B=Bridge Cr=Marcelline I=Implant    Pulmonary Exam    Patient Demonstrates:  Non-labored Breathing    Abdominal Exam    Patient Demonstrates:  Obese      Vitals:   Patient Vitals for the past 4 hrs (Last 1 readings):   BP Temp Temp src Pulse Resp SpO2   07/16/25 1101 (!) 141/60 36.4 °C (97.6 °F) Oral 79 19 97 %         Anesthesia Plan:    ASA Status: 4  Anesthesia Type: MAC    Induction: Intravenous  Maintenance: TIVA    Post-op Pain Management: Per Surgeon      Checklist  Reviewed: NPO Status, Allergies, Medications, Problem list, Past Med History and Patient Summary  Consent/Risks Discussed Statement:  The proposed anesthetic plan, including its risks and benefits, have been discussed with the Patient along with the risks and benefits of alternatives. Questions were encouraged and answered and the patient and/or representative understands and agrees to proceed.    I have discussed elements of the patient's history or examination, as noted above and/or as follows, that place the patient at higher risk of complications; heart disease, BMI> 30 (obesity), sleep apnea and liver disease.    I discussed with the patient (and/or patient's legal representative) the risks and benefits of the proposed anesthesia plan, MAC, which may include services performed by other anesthesia providers.    Alternative anesthesia plans, if available, were reviewed with the patient (and/or patient's legal representative). Discussion has been held with the patient (and/or patient's legal representative) regarding risks of anesthesia,  which include Intra-operative Awareness and emergent situations that may require change in anesthesia plan.    The patient (and/or patient's legal representative) has indicated understanding, his/her questions have been answered, and he/she wishes to proceed with the planned anesthetic.    Blood Products: Not Anticipated

## 2025-07-31 RX ORDER — SIMVASTATIN 10 MG/1
TABLET, FILM COATED ORAL
Qty: 90 TABLET | Refills: 0 | Status: SHIPPED | OUTPATIENT
Start: 2025-07-31

## 2025-07-31 NOTE — TELEPHONE ENCOUNTER
Medicine Refill Request    Last Office Visit: 12/11/2024   Last Consult Visit: Visit date not found  Last Telemedicine Visit: Visit date not found    Next Appointment: Visit date not found      Current Outpatient Medications:     aspirin 81 mg enteric coated tablet, 81 mg. Every other day, Disp: , Rfl:     famotidine (PEPCID) 20 mg tablet, Take 20 mg by mouth daily. One daily, Disp: , Rfl:     finasteride (PROSCAR) 5 mg tablet, Take 5 mg by mouth daily. Taking every other day , Disp: , Rfl:     lidocaine (LIDODERM) 5 % patch, Apply 1 patch topically daily. Remove & discard patch within 12 hours or as directed by presscriber., Disp: 20 patch, Rfl: 0    metoprolol succinate XL (TOPROL-XL) 50 mg 24 hr tablet, Take 1 tablet (50 mg total) by mouth daily., Disp: 90 tablet, Rfl: 3    simvastatin (ZOCOR) 10 mg tablet, TAKE 1 TABLET BY MOUTH DAILY AT NIGHT AS DIRECTED, Disp: 90 tablet, Rfl: 1    tadalafiL (CIALIS) 20 mg tablet, Take 20 mg by mouth daily as needed for erectile dysfunction., Disp: , Rfl:     tamsulosin (FLOMAX) 0.4 mg capsule, Take 0.4 mg by mouth., Disp: , Rfl:     tirzepatide, weight loss, (ZEPBOUND) 5 mg/0.5 mL solution VIAL for injection, Inject 0.5 mL (5 mg total) under the skin every (seven) 7 days., Disp: 2 mL, Rfl: 1      BP Readings from Last 3 Encounters:   12/11/24 126/72   12/10/24 118/74   11/14/24 134/84       Recent Lab results:  Lab Results   Component Value Date    CHOL 169 12/12/2024   ,   Lab Results   Component Value Date    HDL 48 12/12/2024   ,   Lab Results   Component Value Date    LDLCALC 92 12/12/2024   ,   Lab Results   Component Value Date    TRIG 144 12/12/2024        Lab Results   Component Value Date    GLUCOSE 107 (H) 12/12/2024   ,   Lab Results   Component Value Date    HGBA1C 5.9 (H) 12/12/2024         Lab Results   Component Value Date    CREATININE 1.2 12/12/2024       Lab Results   Component Value Date    TSH 3.34 12/12/2024           Lab Results   Component Value Date     HGBA1C 5.9 (H) 12/12/2024       Lab Results   Component Value Date    EGFR >60.0 12/12/2024    EGFR >60.0 08/09/2023    EGFR 56.9 (L) 11/28/2021